# Patient Record
Sex: FEMALE | Race: WHITE | Employment: FULL TIME | URBAN - METROPOLITAN AREA
[De-identification: names, ages, dates, MRNs, and addresses within clinical notes are randomized per-mention and may not be internally consistent; named-entity substitution may affect disease eponyms.]

---

## 2017-04-04 ENCOUNTER — EMPLOYEE WELLNESS (OUTPATIENT)
Dept: OTHER | Age: 47
End: 2017-04-04

## 2017-04-04 LAB
CHOLESTEROL, TOTAL: 243 MG/DL (ref 0–199)
GLUCOSE BLD-MCNC: 75 MG/DL (ref 70–99)
HDLC SERPL-MCNC: 55 MG/DL (ref 40–60)
LDL CHOLESTEROL CALCULATED: 169 MG/DL
TRIGL SERPL-MCNC: 93 MG/DL (ref 0–150)

## 2017-04-10 ENCOUNTER — TELEPHONE (OUTPATIENT)
Dept: FAMILY MEDICINE CLINIC | Age: 47
End: 2017-04-10

## 2017-09-14 ENCOUNTER — OFFICE VISIT (OUTPATIENT)
Dept: DERMATOLOGY | Age: 47
End: 2017-09-14

## 2017-09-14 DIAGNOSIS — Z79.899 HIGH RISK MEDICATION USE: ICD-10-CM

## 2017-09-14 DIAGNOSIS — L40.9 PSORIASIS: ICD-10-CM

## 2017-09-14 DIAGNOSIS — L40.9 PSORIASIS: Primary | ICD-10-CM

## 2017-09-14 LAB
ALBUMIN SERPL-MCNC: 4.5 G/DL (ref 3.4–5)
ALP BLD-CCNC: 55 U/L (ref 40–129)
ALT SERPL-CCNC: 13 U/L (ref 10–40)
ANION GAP SERPL CALCULATED.3IONS-SCNC: 13 MMOL/L (ref 3–16)
AST SERPL-CCNC: 15 U/L (ref 15–37)
BASOPHILS ABSOLUTE: 0.1 K/UL (ref 0–0.2)
BASOPHILS RELATIVE PERCENT: 0.7 %
BILIRUB SERPL-MCNC: <0.2 MG/DL (ref 0–1)
BILIRUBIN DIRECT: <0.2 MG/DL (ref 0–0.3)
BILIRUBIN, INDIRECT: NORMAL MG/DL (ref 0–1)
BUN BLDV-MCNC: 11 MG/DL (ref 7–20)
CALCIUM SERPL-MCNC: 9.8 MG/DL (ref 8.3–10.6)
CHLORIDE BLD-SCNC: 102 MMOL/L (ref 99–110)
CO2: 25 MMOL/L (ref 21–32)
CREAT SERPL-MCNC: 0.7 MG/DL (ref 0.6–1.1)
EOSINOPHILS ABSOLUTE: 0.1 K/UL (ref 0–0.6)
EOSINOPHILS RELATIVE PERCENT: 1 %
GFR AFRICAN AMERICAN: >60
GFR NON-AFRICAN AMERICAN: >60
GLUCOSE BLD-MCNC: 85 MG/DL (ref 70–99)
HCT VFR BLD CALC: 44.1 % (ref 36–48)
HEMOGLOBIN: 14.4 G/DL (ref 12–16)
LYMPHOCYTES ABSOLUTE: 3 K/UL (ref 1–5.1)
LYMPHOCYTES RELATIVE PERCENT: 21.7 %
MCH RBC QN AUTO: 29.6 PG (ref 26–34)
MCHC RBC AUTO-ENTMCNC: 32.7 G/DL (ref 31–36)
MCV RBC AUTO: 90.3 FL (ref 80–100)
MONOCYTES ABSOLUTE: 0.7 K/UL (ref 0–1.3)
MONOCYTES RELATIVE PERCENT: 4.8 %
NEUTROPHILS ABSOLUTE: 10 K/UL (ref 1.7–7.7)
NEUTROPHILS RELATIVE PERCENT: 71.8 %
PDW BLD-RTO: 13.8 % (ref 12.4–15.4)
PLATELET # BLD: 203 K/UL (ref 135–450)
PMV BLD AUTO: 10.9 FL (ref 5–10.5)
POTASSIUM SERPL-SCNC: 4.7 MMOL/L (ref 3.5–5.1)
RBC # BLD: 4.88 M/UL (ref 4–5.2)
SODIUM BLD-SCNC: 140 MMOL/L (ref 136–145)
TOTAL PROTEIN: 7.1 G/DL (ref 6.4–8.2)
WBC # BLD: 14 K/UL (ref 4–11)

## 2017-09-14 PROCEDURE — 99213 OFFICE O/P EST LOW 20 MIN: CPT | Performed by: DERMATOLOGY

## 2017-09-16 LAB
QUANTIFERON (R) TB GOLD (INCUBATED): NEGATIVE
QUANTIFERON MITOGEN: >10 IU/ML
QUANTIFERON NIL: 0.02 IU/ML
QUANTIFERON TB AG MINUS NIL: 0.01 IU/ML (ref 0–0.34)

## 2017-12-05 ENCOUNTER — OFFICE VISIT (OUTPATIENT)
Dept: FAMILY MEDICINE CLINIC | Age: 47
End: 2017-12-05

## 2017-12-05 VITALS
OXYGEN SATURATION: 98 % | SYSTOLIC BLOOD PRESSURE: 120 MMHG | HEART RATE: 74 BPM | WEIGHT: 144 LBS | HEIGHT: 63 IN | DIASTOLIC BLOOD PRESSURE: 70 MMHG | TEMPERATURE: 98.4 F | BODY MASS INDEX: 25.52 KG/M2

## 2017-12-05 DIAGNOSIS — Z00.00 HEALTHCARE MAINTENANCE: Primary | ICD-10-CM

## 2017-12-05 DIAGNOSIS — J40 BRONCHITIS: ICD-10-CM

## 2017-12-05 DIAGNOSIS — E78.5 HYPERLIPIDEMIA, UNSPECIFIED HYPERLIPIDEMIA TYPE: ICD-10-CM

## 2017-12-05 DIAGNOSIS — L40.9 PSORIASIS: ICD-10-CM

## 2017-12-05 PROCEDURE — 99396 PREV VISIT EST AGE 40-64: CPT | Performed by: FAMILY MEDICINE

## 2017-12-05 RX ORDER — ALBUTEROL SULFATE 90 UG/1
2 AEROSOL, METERED RESPIRATORY (INHALATION) EVERY 4 HOURS PRN
Qty: 1 INHALER | Refills: 0 | Status: SHIPPED | OUTPATIENT
Start: 2017-12-05 | End: 2019-02-20 | Stop reason: ALTCHOICE

## 2017-12-05 RX ORDER — AZITHROMYCIN 250 MG/1
TABLET, FILM COATED ORAL
Qty: 1 PACKET | Refills: 0 | Status: SHIPPED | OUTPATIENT
Start: 2017-12-05 | End: 2017-12-15

## 2017-12-05 ASSESSMENT — PATIENT HEALTH QUESTIONNAIRE - PHQ9
SUM OF ALL RESPONSES TO PHQ9 QUESTIONS 1 & 2: 0
SUM OF ALL RESPONSES TO PHQ QUESTIONS 1-9: 0
1. LITTLE INTEREST OR PLEASURE IN DOING THINGS: 0
2. FEELING DOWN, DEPRESSED OR HOPELESS: 0

## 2017-12-05 NOTE — PROGRESS NOTES
History and Physical      Chief Coplaint:   Onesimo Abreu is a 49 y.o. female who presents for complete physical examination. HPI: URI: Symptoms started 2 weeks. Having coughing and more winded. Having congestion. No earache. No fever. No ST. Takes  Tylenol prn which helps with the congestion. Psoriasis: doing great on humira. HLD:  Works on diet sometimes. The 10-year ASCVD risk score (Bib Bustos, et al., 2013) is: 1.2%    Values used to calculate the score:      Age: 49 years      Sex: Female      Is Non- : No      Diabetic: No      Tobacco smoker: No      Systolic Blood Pressure: 778 mmHg      Is BP treated: No      HDL Cholesterol: 55 mg/dL      Total Cholesterol: 243 mg/dL      Wt Readings from Last 3 Encounters:   12/05/17 144 lb (65.3 kg)   07/12/16 143 lb 3.2 oz (65 kg)   06/06/16 141 lb (64 kg)     BP Readings from Last 3 Encounters:   12/05/17 120/70   07/12/16 112/68   06/06/16 104/76       Patient Active Problem List   Diagnosis    Psoriasis    Hyperlipidemia       Preventive Care:  Health Maintenance   Topic Date Due    Flu vaccine (1) 09/01/2017    Cervical cancer screen  06/06/2019    Lipid screen  05/02/2021    DTaP/Tdap/Td vaccine (2 - Td) 06/06/2026    HIV screen  Completed        Immunization History   Administered Date(s) Administered    Tdap (Boostrix, Adacel) 06/06/2016       Allergies   Allergen Reactions    Penicillins Anaphylaxis     Outpatient Prescriptions Marked as Taking for the 12/5/17 encounter (Office Visit) with Rickey Diamond MD   Medication Sig Dispense Refill    azithromycin (ZITHROMAX Z-TJ) 250 MG tablet Take 2 tablets on day 1, then 1 tablet daily for the next 4 days.  1 packet 0    albuterol sulfate HFA (PROAIR HFA) 108 (90 Base) MCG/ACT inhaler Inhale 2 puffs into the lungs every 4 hours as needed for Wheezing 1 Inhaler 0    adalimumab (HUMIRA) 40 MG/0.8ML injection 40 mg SC q2 weeks 2 each 6       Past Medical friction rub. No murmur heard. Pulmonary/Chest: Effort normal and breath sounds normal. No respiratory distress. She has no wheezes, rhonchi or rales. Abdominal: Soft, non-tender. Bowel sounds are normal. She exhibits no palpable organomegaly or mass. Musculoskeletal: Normal range of motion. She exhibits no edema. Neurological: She is alert and oriented. No cranial nerve deficit. Coordination normal.   Skin: Skin is warm and dry. There is no rash or erythema. Psychiatric: She has a normal mood and affect. Her speech is normal and behavior is normal. Judgment, cognition and memory are normal.       Assessment/Plan     1. Healthcare maintenance  Annual physical exam done today. Counseled on preventative care and a healthy lifestlye. Discussed optimal calcium intake emphasizing the importance of getting as much of the daily calcium requirement as possible from dietary sources. Immunization history reviewed. 2. Bronchitis  Discussed supportive care. Medications as listed below. Follow-up if not improving or worsening.  - azithromycin (ZITHROMAX Z-TJ) 250 MG tablet; Take 2 tablets on day 1, then 1 tablet daily for the next 4 days. Dispense: 1 packet; Refill: 0  - albuterol sulfate HFA (PROAIR HFA) 108 (90 Base) MCG/ACT inhaler; Inhale 2 puffs into the lungs every 4 hours as needed for Wheezing  Dispense: 1 Inhaler; Refill: 0    3. Psoriasis  Stable. Continue current regimen. Continue to follow up with dermatology. Discussed reducing risk of infections given that she is on Humira. 4. Hyperlipidemia, unspecified hyperlipidemia type  Discussed medication versus diet and exercise. Patient does not want to do medication at this time. She'll continue to work on diet and exercise more diligently. Discussed medications with patient, who voiced understanding of their use and indications. All questions answered.     Return in about 1 year (around 12/5/2018) for Physical.

## 2018-02-14 ENCOUNTER — OFFICE VISIT (OUTPATIENT)
Dept: DERMATOLOGY | Age: 48
End: 2018-02-14

## 2018-02-14 DIAGNOSIS — L40.9 PSORIASIS: Primary | ICD-10-CM

## 2018-02-14 DIAGNOSIS — D22.9 BENIGN NEVUS: ICD-10-CM

## 2018-02-14 DIAGNOSIS — Z79.899 HIGH RISK MEDICATION USE: ICD-10-CM

## 2018-02-14 DIAGNOSIS — L57.8 DIFFUSE PHOTODAMAGE OF SKIN: ICD-10-CM

## 2018-02-14 PROCEDURE — 99214 OFFICE O/P EST MOD 30 MIN: CPT | Performed by: DERMATOLOGY

## 2018-02-27 ENCOUNTER — TELEPHONE (OUTPATIENT)
Dept: FAMILY MEDICINE CLINIC | Age: 48
End: 2018-02-27

## 2018-02-27 NOTE — TELEPHONE ENCOUNTER
Pt called,  had suggested cholesterol medication based on be well with in lab results and patient declined,but has reconsidered and would like mediation sent to [facility] pharmacy as a 90 day supply

## 2018-03-20 VITALS — BODY MASS INDEX: 25.06 KG/M2 | WEIGHT: 137 LBS

## 2018-03-26 ENCOUNTER — TELEPHONE (OUTPATIENT)
Dept: FAMILY MEDICINE CLINIC | Age: 48
End: 2018-03-26

## 2018-03-26 NOTE — TELEPHONE ENCOUNTER
Given that it's been almost a year since she last had her labs checked out recommend that we recheck her cholesterol prior to starting medication.

## 2018-03-26 NOTE — TELEPHONE ENCOUNTER
Pt called to say she changed her mind on taking the cholesterol medication. Pt would like to have a new Rx called in to [facility] order.

## 2018-05-14 ENCOUNTER — EMPLOYEE WELLNESS (OUTPATIENT)
Dept: OTHER | Age: 48
End: 2018-05-14

## 2018-05-21 VITALS — BODY MASS INDEX: 25.86 KG/M2 | WEIGHT: 146 LBS

## 2018-06-07 DIAGNOSIS — E78.5 HYPERLIPIDEMIA, UNSPECIFIED HYPERLIPIDEMIA TYPE: Primary | ICD-10-CM

## 2018-06-07 RX ORDER — ATORVASTATIN CALCIUM 10 MG/1
10 TABLET, FILM COATED ORAL DAILY
Qty: 90 TABLET | Refills: 0 | Status: SHIPPED | OUTPATIENT
Start: 2018-06-07 | End: 2018-06-07 | Stop reason: SDUPTHER

## 2018-06-07 RX ORDER — ATORVASTATIN CALCIUM 10 MG/1
10 TABLET, FILM COATED ORAL DAILY
Qty: 90 TABLET | Refills: 0 | Status: SHIPPED
Start: 2018-06-07 | End: 2020-10-16

## 2018-06-18 RX ORDER — PRAVASTATIN SODIUM 10 MG
10 TABLET ORAL DAILY
Qty: 90 TABLET | Refills: 0 | Status: SHIPPED | OUTPATIENT
Start: 2018-06-18 | End: 2019-02-20

## 2018-08-22 ENCOUNTER — OFFICE VISIT (OUTPATIENT)
Dept: DERMATOLOGY | Age: 48
End: 2018-08-22

## 2018-08-22 DIAGNOSIS — Z79.899 HIGH RISK MEDICATION USE: ICD-10-CM

## 2018-08-22 DIAGNOSIS — L40.9 PSORIASIS: ICD-10-CM

## 2018-08-22 DIAGNOSIS — L40.9 PSORIASIS: Primary | ICD-10-CM

## 2018-08-22 LAB
ALBUMIN SERPL-MCNC: 4.4 G/DL (ref 3.4–5)
ALP BLD-CCNC: 45 U/L (ref 40–129)
ALT SERPL-CCNC: 12 U/L (ref 10–40)
AST SERPL-CCNC: 15 U/L (ref 15–37)
BILIRUB SERPL-MCNC: <0.2 MG/DL (ref 0–1)
BILIRUBIN DIRECT: <0.2 MG/DL (ref 0–0.3)
BILIRUBIN, INDIRECT: NORMAL MG/DL (ref 0–1)
HCT VFR BLD CALC: 37 % (ref 36–48)
HEMOGLOBIN: 12.2 G/DL (ref 12–16)
MCH RBC QN AUTO: 26.5 PG (ref 26–34)
MCHC RBC AUTO-ENTMCNC: 33 G/DL (ref 31–36)
MCV RBC AUTO: 80.4 FL (ref 80–100)
PDW BLD-RTO: 16.9 % (ref 12.4–15.4)
PLATELET # BLD: 243 K/UL (ref 135–450)
PMV BLD AUTO: 10.1 FL (ref 5–10.5)
RBC # BLD: 4.6 M/UL (ref 4–5.2)
TOTAL PROTEIN: 7.3 G/DL (ref 6.4–8.2)
WBC # BLD: 7.7 K/UL (ref 4–11)

## 2018-08-22 PROCEDURE — 99213 OFFICE O/P EST LOW 20 MIN: CPT | Performed by: DERMATOLOGY

## 2018-08-22 NOTE — PROGRESS NOTES
[facility]) Dermatology  Cyndie Hernandez M.D.  [address] [place] [address], [place]  1970    49 y.o. female     Date of Visit: 8/22/2018    Chief Complaint:   Chief Complaint   Patient presents with    Follow-up     declines TBSE, Psoriasis is gone on Humira treatment         I was asked to see this patient by Dr. Esparza ref. provider found. History of Present Illness:  1. Patient presents for follow-up of psoriasis. Has been on Humira since December 2016. Has developed some mild erythema and pruritus lasts for about a day after her injections-resolves without intervention. Very happy with her improvement on Humira-rarely has active psoriasis. Occasional small papule on her elbows. Otherwise clear. Total body skin exam done at her last visit. Review of Systems:  Constitutional: Reports general sense of well-being       Past Medical History, Surgical History, Family History, Medications and Allergies reviewed. Social History:   Social History     Social History    Marital status:      Spouse name: N/A    Number of children: N/A    Years of education: N/A     Occupational History    Not on file. Social History Main Topics    Smoking status: Former Smoker     Packs/day: 1.00     Years: 10.00     Types: Cigarettes     Start date: 2/1/1992     Quit date: 2/1/2002    Smokeless tobacco: Never Used    Alcohol use 0.0 oz/week      Comment: socially    Drug use: No    Sexual activity: Yes     Partners: Male     Other Topics Concern    Not on file     Social History Narrative    06/06/2016    Lives with spouse and 4 kids. Has 12yo, 12yo. Has 2 grown children. Physical Examination       -General: Well-appearing, NAD  1. Well-developed well-nourished. Few small erythematous papules on her elbows. Otherwise scalp, arms, legs clear. Mild background photodamage with freckling and lentigines. Moderate tan. Assessment and Plan     1.  Psoriasis -Minimal active psoriasis, well

## 2018-08-26 LAB
QUANTI TB GOLD PLUS: NEGATIVE
QUANTI TB1 MINUS NIL: 0 IU/ML (ref 0–0.34)
QUANTI TB2 MINUS NIL: 0 IU/ML (ref 0–0.34)
QUANTIFERON MITOGEN: >10 IU/ML
QUANTIFERON NIL: 0.03 IU/ML

## 2018-08-28 ENCOUNTER — PATIENT MESSAGE (OUTPATIENT)
Dept: FAMILY MEDICINE CLINIC | Age: 48
End: 2018-08-28

## 2019-02-20 ENCOUNTER — OFFICE VISIT (OUTPATIENT)
Dept: DERMATOLOGY | Age: 49
End: 2019-02-20
Payer: COMMERCIAL

## 2019-02-20 DIAGNOSIS — L72.0 MILIUM: ICD-10-CM

## 2019-02-20 DIAGNOSIS — L40.9 PSORIASIS: Primary | ICD-10-CM

## 2019-02-20 PROCEDURE — 99213 OFFICE O/P EST LOW 20 MIN: CPT | Performed by: DERMATOLOGY

## 2019-02-20 RX ORDER — CLOBETASOL PROPIONATE 0.5 MG/G
CREAM TOPICAL
Qty: 60 G | Refills: 2 | Status: SHIPPED | OUTPATIENT
Start: 2019-02-20

## 2019-02-21 ENCOUNTER — OFFICE VISIT (OUTPATIENT)
Dept: ORTHOPEDIC SURGERY | Age: 49
End: 2019-02-21
Payer: COMMERCIAL

## 2019-02-21 VITALS
BODY MASS INDEX: 25.34 KG/M2 | WEIGHT: 143 LBS | DIASTOLIC BLOOD PRESSURE: 58 MMHG | SYSTOLIC BLOOD PRESSURE: 115 MMHG | HEIGHT: 63 IN

## 2019-02-21 DIAGNOSIS — M18.12 ARTHRITIS OF CARPOMETACARPAL (CMC) JOINT OF LEFT THUMB: ICD-10-CM

## 2019-02-21 DIAGNOSIS — M79.644 FINGER PAIN, RIGHT: Primary | ICD-10-CM

## 2019-02-21 DIAGNOSIS — M18.11 ARTHRITIS OF CARPOMETACARPAL (CMC) JOINT OF RIGHT THUMB: ICD-10-CM

## 2019-02-21 DIAGNOSIS — M79.645 FINGER PAIN, LEFT: ICD-10-CM

## 2019-02-21 PROCEDURE — 99203 OFFICE O/P NEW LOW 30 MIN: CPT | Performed by: ORTHOPAEDIC SURGERY

## 2019-02-21 PROCEDURE — L3924 HFO WITHOUT JOINTS PRE OTS: HCPCS | Performed by: ORTHOPAEDIC SURGERY

## 2019-02-21 PROCEDURE — 20600 DRAIN/INJ JOINT/BURSA W/O US: CPT | Performed by: ORTHOPAEDIC SURGERY

## 2019-05-22 ENCOUNTER — TELEPHONE (OUTPATIENT)
Dept: DERMATOLOGY | Age: 49
End: 2019-05-22

## 2019-10-02 ENCOUNTER — TELEPHONE (OUTPATIENT)
Dept: DERMATOLOGY | Age: 49
End: 2019-10-02

## 2019-10-04 DIAGNOSIS — L40.9 PSORIASIS: ICD-10-CM

## 2019-10-04 LAB
A/G RATIO: 1.9 (ref 1.1–2.2)
ALBUMIN SERPL-MCNC: 4.7 G/DL (ref 3.4–5)
ALP BLD-CCNC: 55 U/L (ref 40–129)
ALT SERPL-CCNC: 14 U/L (ref 10–40)
ANION GAP SERPL CALCULATED.3IONS-SCNC: 16 MMOL/L (ref 3–16)
AST SERPL-CCNC: 18 U/L (ref 15–37)
BILIRUB SERPL-MCNC: 0.3 MG/DL (ref 0–1)
BUN BLDV-MCNC: 8 MG/DL (ref 7–20)
CALCIUM SERPL-MCNC: 10 MG/DL (ref 8.3–10.6)
CHLORIDE BLD-SCNC: 105 MMOL/L (ref 99–110)
CO2: 23 MMOL/L (ref 21–32)
CREAT SERPL-MCNC: 0.8 MG/DL (ref 0.6–1.1)
GFR AFRICAN AMERICAN: >60
GFR NON-AFRICAN AMERICAN: >60
GLOBULIN: 2.5 G/DL
GLUCOSE BLD-MCNC: 90 MG/DL (ref 70–99)
HCT VFR BLD CALC: 42.4 % (ref 36–48)
HEMOGLOBIN: 13.8 G/DL (ref 12–16)
MCH RBC QN AUTO: 27.4 PG (ref 26–34)
MCHC RBC AUTO-ENTMCNC: 32.5 G/DL (ref 31–36)
MCV RBC AUTO: 84.1 FL (ref 80–100)
PDW BLD-RTO: 17.2 % (ref 12.4–15.4)
PLATELET # BLD: 239 K/UL (ref 135–450)
PMV BLD AUTO: 10.5 FL (ref 5–10.5)
POTASSIUM SERPL-SCNC: 4.5 MMOL/L (ref 3.5–5.1)
RBC # BLD: 5.05 M/UL (ref 4–5.2)
SODIUM BLD-SCNC: 144 MMOL/L (ref 136–145)
TOTAL PROTEIN: 7.2 G/DL (ref 6.4–8.2)
WBC # BLD: 7.1 K/UL (ref 4–11)

## 2019-10-07 LAB
QUANTI TB GOLD PLUS: NEGATIVE
QUANTI TB1 MINUS NIL: 0 IU/ML (ref 0–0.34)
QUANTI TB2 MINUS NIL: 0 IU/ML (ref 0–0.34)
QUANTIFERON MITOGEN: >10 IU/ML
QUANTIFERON NIL: 0.02 IU/ML

## 2019-10-31 ENCOUNTER — OFFICE VISIT (OUTPATIENT)
Dept: DERMATOLOGY | Age: 49
End: 2019-10-31
Payer: COMMERCIAL

## 2019-10-31 DIAGNOSIS — D48.5 NEOPLASM OF UNCERTAIN BEHAVIOR OF SKIN: ICD-10-CM

## 2019-10-31 DIAGNOSIS — D22.9 BENIGN NEVUS: ICD-10-CM

## 2019-10-31 DIAGNOSIS — L40.9 PSORIASIS: Primary | ICD-10-CM

## 2019-10-31 DIAGNOSIS — L57.0 KERATOSIS, ACTINIC: ICD-10-CM

## 2019-10-31 PROCEDURE — 99214 OFFICE O/P EST MOD 30 MIN: CPT | Performed by: DERMATOLOGY

## 2019-10-31 PROCEDURE — 11102 TANGNTL BX SKIN SINGLE LES: CPT | Performed by: DERMATOLOGY

## 2019-10-31 PROCEDURE — 17000 DESTRUCT PREMALG LESION: CPT | Performed by: DERMATOLOGY

## 2019-10-31 PROCEDURE — 17003 DESTRUCT PREMALG LES 2-14: CPT | Performed by: DERMATOLOGY

## 2019-11-04 LAB — DERMATOLOGY PATHOLOGY REPORT: NORMAL

## 2020-06-25 RX ORDER — ADALIMUMAB 40MG/0.4ML
KIT SUBCUTANEOUS
Qty: 2 EACH | Refills: 6 | Status: SHIPPED | OUTPATIENT
Start: 2020-06-25 | Stop reason: SDUPTHER

## 2020-07-22 ENCOUNTER — OFFICE VISIT (OUTPATIENT)
Dept: DERMATOLOGY | Age: 50
End: 2020-07-22
Payer: COMMERCIAL

## 2020-07-22 VITALS — TEMPERATURE: 97.9 F

## 2020-07-22 PROCEDURE — 99213 OFFICE O/P EST LOW 20 MIN: CPT | Performed by: DERMATOLOGY

## 2020-07-22 NOTE — PROGRESS NOTES
[facility]) Dermatology  Hemal Reddy M.D.  [address] [place] [address]  1970    47 y.o. female     Date of Visit: 2020    Chief Complaint:   Chief Complaint   Patient presents with    Skin Exam        I was asked to see this patient by Dr. Esparza ref. provider found. History of Present Illness:  1. Total-body skin exam      Multiple nevi-stable in size, shape, color. Has not noticed any new or changing pigmented lesions. Psoriasis-doing well on Humira. Has residual psoriasis both elbows that is not bothersome. Very happy with clearance of psoriasis on Humira. She is using citrate free formula which is less painful. No joint pain. No difficulty with infections. Due for lab draw-was delayed due to [place] outbreak. Skin history:    Psoriasis-Humira since 2016. Residual psoriasis elbows    No personal history of skin cancer. + History of actinic keratoses-liquid nitrogen    Mother with a history of melanoma    Former tanning bed user. Now does practice sun avoidance and wear sunscreen    Review of Systems:  Constitutional: Reports general sense of well-being       Past Medical History, Surgical History, Family History, Medications and Allergies reviewed.     Social History:   Social History     Socioeconomic History    Marital status:      Spouse name: Not on file    Number of children: Not on file    Years of education: Not on file    Highest education level: Not on file   Occupational History    Not on file   Social Needs    Financial resource strain: Not on file    Food insecurity     Worry: Not on file     Inability: Not on file    Transportation needs     Medical: Not on file     Non-medical: Not on file   Tobacco Use    Smoking status: Former Smoker     Packs/day: 1.00     Years: 10.00     Pack years: 10.00     Types: Cigarettes     Start date: 1992     Last attempt to quit: 2002     Years since quittin.4    Smokeless tobacco: Never Used Substance and Sexual Activity    Alcohol use: Yes     Alcohol/week: 0.0 standard drinks     Comment: socially    Drug use: No    Sexual activity: Yes     Partners: Male   Lifestyle    Physical activity     Days per week: Not on file     Minutes per session: Not on file    Stress: Not on file   Relationships    Social connections     Talks on phone: Not on file     Gets together: Not on file     Attends Jainism service: Not on file     Active member of club or organization: Not on file     Attends meetings of clubs or organizations: Not on file     Relationship status: Not on file    Intimate partner violence     Fear of current or ex partner: Not on file     Emotionally abused: Not on file     Physically abused: Not on file     Forced sexual activity: Not on file   Other Topics Concern    Not on file   Social History Narrative    06/06/2016    Lives with spouse and 4 kids. Has 12yo, 10yo. Has 2 grown children. Physical Examination       -General: Well-appearing, NAD  Normal affect. Total body skin exam including scalp, face, neck, chest, abdomen, back, bilateral upper extremities, bilateral lower extremities, ocular conjunctiva, external lips, and nails was performed. Examination normal unless stated below. Underwear area not examined. Scattered on the trunk and extremities are multiple well-defined round and oval symmetric smoothly-bordered uniformly brown macules and papules. Well-demarcated erythematous scaly plaques both elbows      Assessment and Plan     1. Psoriasis-continue Humira citrate free formula.   Due for labs-we will have her draw labs including QuantiFERON gold, which is due in October.    -Reviewed risks of injection-site reaction, flu-like symptoms, immunosuppression (serious infection, reactivation of latent TB), leukopenia, increased risk of MACE events, lymphoma/malignancy, parasthesia/MS changes, autoimmune disease.  -Baseline labs: CBC/diff, renal, LFTs  -Maintenance labs: LFTs q6mo. CBC/diff, renal annually.  -QuantiFERON gold 10/19     2. Benign nevus - Benign acquired melanocytic nevi  -Recommend monthly self skin exams   -Educated regarding the ABCDEs of melanoma detection   -Call for any new/changing moles or concerning lesions  -Reviewed sun protective behavior -- sun avoidance during the peak hours of the day, sun-protective clothing (including hat and sunglasses), sunscreen use (water resistant, broad spectrum, SPF at least 30, need for reapplication every 2 to 3 hours), avoidance of tanning beds        Addendum: Discussed tretinoin . 025% cream QHS for photodamage- reviewed risks, side effects- avoid waxes, peels. May worsen mild background erythema (vasc rosacea). + sun protection.

## 2020-07-24 ENCOUNTER — PATIENT MESSAGE (OUTPATIENT)
Dept: DERMATOLOGY | Age: 50
End: 2020-07-24

## 2020-07-24 NOTE — TELEPHONE ENCOUNTER
From: Harshal Chapin  To: Mateusz Rodriguez MD  Sent: 7/24/2020 8:59 AM EDT  Subject: Prescription Question    Hello  During my visit I asked for a prescription of Retin-A cream but I do not see where it has been sent to [facility]. Can you please review and send to [facility].      Thanks  [employer]

## 2020-08-03 ENCOUNTER — EMPLOYEE WELLNESS (OUTPATIENT)
Dept: OTHER | Age: 50
End: 2020-08-03

## 2020-08-03 LAB
CHOLESTEROL, TOTAL: 294 MG/DL
CHOLESTEROL/HDL RATIO: ABNORMAL
GLUCOSE BLD-MCNC: 100 MG/DL
HDLC SERPL-MCNC: 61 MG/DL (ref 35–70)
LDL CHOLESTEROL CALCULATED: 195 MG/DL (ref 0–160)
NONHDLC SERPL-MCNC: ABNORMAL MG/DL
TRIGL SERPL-MCNC: 188 MG/DL
VLDLC SERPL CALC-MCNC: ABNORMAL MG/DL

## 2020-08-17 ENCOUNTER — TELEPHONE (OUTPATIENT)
Dept: FAMILY MEDICINE CLINIC | Age: 50
End: 2020-08-17

## 2020-08-17 ENCOUNTER — PATIENT MESSAGE (OUTPATIENT)
Dept: FAMILY MEDICINE CLINIC | Age: 50
End: 2020-08-17

## 2020-08-17 NOTE — TELEPHONE ENCOUNTER
The patient's cholesterol was very high on recent wellness screen for work. She has not been seen since 2017. Please call her and ask her to schedule an appointment if she is still using me as her PCP. Thank you.

## 2020-09-23 DIAGNOSIS — L40.9 PSORIASIS: ICD-10-CM

## 2020-09-23 LAB
HCT VFR BLD CALC: 45.5 % (ref 36–48)
HEMOGLOBIN: 15 G/DL (ref 12–16)
MCH RBC QN AUTO: 30.8 PG (ref 26–34)
MCHC RBC AUTO-ENTMCNC: 33 G/DL (ref 31–36)
MCV RBC AUTO: 93.5 FL (ref 80–100)
PDW BLD-RTO: 13.9 % (ref 12.4–15.4)
PLATELET # BLD: 209 K/UL (ref 135–450)
PMV BLD AUTO: 10.5 FL (ref 5–10.5)
RBC # BLD: 4.87 M/UL (ref 4–5.2)
WBC # BLD: 8.5 K/UL (ref 4–11)

## 2020-09-24 LAB
ALBUMIN SERPL-MCNC: 4.6 G/DL (ref 3.4–5)
ALP BLD-CCNC: 66 U/L (ref 40–129)
ALT SERPL-CCNC: 15 U/L (ref 10–40)
ANION GAP SERPL CALCULATED.3IONS-SCNC: 10 MMOL/L (ref 3–16)
AST SERPL-CCNC: 17 U/L (ref 15–37)
BILIRUB SERPL-MCNC: 0.3 MG/DL (ref 0–1)
BILIRUBIN DIRECT: <0.2 MG/DL (ref 0–0.3)
BILIRUBIN, INDIRECT: NORMAL MG/DL (ref 0–1)
BUN BLDV-MCNC: 10 MG/DL (ref 7–20)
CALCIUM SERPL-MCNC: 9.7 MG/DL (ref 8.3–10.6)
CHLORIDE BLD-SCNC: 103 MMOL/L (ref 99–110)
CO2: 26 MMOL/L (ref 21–32)
CREAT SERPL-MCNC: 0.8 MG/DL (ref 0.6–1.1)
GFR AFRICAN AMERICAN: >60
GFR NON-AFRICAN AMERICAN: >60
GLUCOSE BLD-MCNC: 85 MG/DL (ref 70–99)
POTASSIUM SERPL-SCNC: 4.6 MMOL/L (ref 3.5–5.1)
SODIUM BLD-SCNC: 139 MMOL/L (ref 136–145)
TOTAL PROTEIN: 7 G/DL (ref 6.4–8.2)

## 2020-09-24 NOTE — TELEPHONE ENCOUNTER
From: Reynaldo FERRARI  To: Sarah North  Sent: 8/17/2020 4:51 PM EDT  Subject: appointment needed    [place] -     Dr. Shikha Rubio received your biometric screening results that were done on 8/3/2020. She would like for you to schedule an appointment for your physical and to discuss your results. Your last appointment for a physical was with Dr. Shikha Rubio 12/5/2017 so you are overdue. Please let me know some days / time of day that are best and we can get you scheduled.        AYESHA Daly, AMT  Registered Medical Assistant for:     [address] [facility], [address]  [facility], [address]    P: [phone]  F: [phone]

## 2020-10-16 ENCOUNTER — OFFICE VISIT (OUTPATIENT)
Dept: FAMILY MEDICINE CLINIC | Age: 50
End: 2020-10-16
Payer: COMMERCIAL

## 2020-10-16 VITALS
OXYGEN SATURATION: 98 % | BODY MASS INDEX: 25.69 KG/M2 | HEART RATE: 65 BPM | TEMPERATURE: 96.9 F | DIASTOLIC BLOOD PRESSURE: 80 MMHG | SYSTOLIC BLOOD PRESSURE: 130 MMHG | WEIGHT: 145 LBS | HEIGHT: 63 IN

## 2020-10-16 PROCEDURE — 90686 IIV4 VACC NO PRSV 0.5 ML IM: CPT | Performed by: FAMILY MEDICINE

## 2020-10-16 PROCEDURE — 90750 HZV VACC RECOMBINANT IM: CPT | Performed by: FAMILY MEDICINE

## 2020-10-16 PROCEDURE — 90472 IMMUNIZATION ADMIN EACH ADD: CPT | Performed by: FAMILY MEDICINE

## 2020-10-16 PROCEDURE — 99213 OFFICE O/P EST LOW 20 MIN: CPT | Performed by: FAMILY MEDICINE

## 2020-10-16 PROCEDURE — 99396 PREV VISIT EST AGE 40-64: CPT | Performed by: FAMILY MEDICINE

## 2020-10-16 PROCEDURE — 90471 IMMUNIZATION ADMIN: CPT | Performed by: FAMILY MEDICINE

## 2020-10-16 RX ORDER — ROSUVASTATIN CALCIUM 5 MG/1
5 TABLET, COATED ORAL DAILY
Qty: 90 TABLET | Refills: 3 | Status: SHIPPED | OUTPATIENT
Start: 2020-10-16 | End: 2022-03-14 | Stop reason: SDUPTHER

## 2020-10-16 ASSESSMENT — PATIENT HEALTH QUESTIONNAIRE - PHQ9
2. FEELING DOWN, DEPRESSED OR HOPELESS: 0
SUM OF ALL RESPONSES TO PHQ QUESTIONS 1-9: 0
SUM OF ALL RESPONSES TO PHQ QUESTIONS 1-9: 0
1. LITTLE INTEREST OR PLEASURE IN DOING THINGS: 0
SUM OF ALL RESPONSES TO PHQ QUESTIONS 1-9: 0
SUM OF ALL RESPONSES TO PHQ9 QUESTIONS 1 & 2: 0

## 2020-10-16 NOTE — PROGRESS NOTES
History and Physical      Chief Complaint:   Santos Abdalla is a 45 y.o. female who presents for complete physical examination. Chief Complaint   Patient presents with    Annual Exam     not fasting        HPI: Psoriasis: doing well on humira. Seeing dermatology.      HLD:  Patient reports she had the be well labs this year. Stopped the lipitor as it made her nauseated. LMP:  9 mo ago but has spotting here or there still. No other symptoms.      SH: 10/2020: . Lives with son Madi Zamudio (patient here) and daughter Gisele Aguilera. In a relationship with someone else and sexually active. Moving soon. 06/06/2016  Lives with spouse and 4 kids. Has 12yo, 10yo. Has 2 grown children.      Vitals:    10/16/20 0825   BP: 130/80   Site: Left Upper Arm   Position: Sitting   Cuff Size: Medium Adult   Pulse: 65   Temp: 96.9 °F (36.1 °C)   TempSrc: Temporal   SpO2: 98%   Weight: 145 lb (65.8 kg)   Height: 5' 3\" (1.6 m)       Wt Readings from Last 3 Encounters:   10/16/20 145 lb (65.8 kg)   08/03/20 144 lb (65.3 kg)   02/21/19 143 lb (64.9 kg)     BP Readings from Last 3 Encounters:   10/16/20 130/80   02/21/19 (!) 115/58   12/05/17 120/70       Patient Active Problem List   Diagnosis    Psoriasis    Hyperlipidemia       Preventive Care:  Health Maintenance   Topic Date Due    Cervical cancer screen  06/06/2019    Breast cancer screen  09/25/2019    Colon cancer screen colonoscopy  09/25/2019    Shingles Vaccine (2 of 2) 12/11/2020    Lipid screen  04/04/2022    DTaP/Tdap/Td vaccine (2 - Td) 06/06/2026    Flu vaccine  Completed    HIV screen  Completed    Hepatitis A vaccine  Aged Out    Hepatitis B vaccine  Aged Out    Hib vaccine  Aged Out    Meningococcal (ACWY) vaccine  Aged Out    Pneumococcal 0-64 years Vaccine  Aged [employer] History   Administered Date(s) Administered    Influenza Virus Vaccine 10/25/2018, 10/29/2019    Influenza, Quadv, IM, PF (6 mo and older Fluzone, Flulaval, Fluarix, and 3 yrs and older Afluria) 10/16/2020    Tdap (Boostrix, Adacel) 2016    Zoster Recombinant (Shingrix) 10/16/2020       Allergies   Allergen Reactions    Penicillins Anaphylaxis     Outpatient Medications Marked as Taking for the 10/16/20 encounter (Office Visit) with Sandra Siegel MD   Medication Sig Dispense Refill    rosuvastatin (CRESTOR) 5 MG tablet Take 1 tablet by mouth daily 90 tablet 3    tretinoin (RETIN-A) 0.025 % cream Apply a pea sized amount to the face QHS 20 g 4    Adalimumab (HUMIRA PEN) 40 MG/0.4ML PNKT INJECT 40 MG UNDER THE SKIN EVERY OTHER WEEK. 2 each 6    clobetasol (TEMOVATE) 0.05 % cream Apply to affected area BID PRN flares 60 g 2       Past Medical History:   Diagnosis Date    Psoriasis      Past Surgical History:   Procedure Laterality Date    HERNIA REPAIR  2005     Family History   Problem Relation Age of Onset    Cancer Mother         skin cancer     Social History     Socioeconomic History    Marital status:      Spouse name: Not on file    Number of children: Not on file    Years of education: Not on file    Highest education level: Not on file   Occupational History    Not on file   Social Needs    Financial resource strain: Not on file    Food insecurity     Worry: Not on file     Inability: Not on file    Transportation needs     Medical: Not on file     Non-medical: Not on file   Tobacco Use    Smoking status: Former Smoker     Packs/day: 1.00     Years: 10.00     Pack years: 10.00     Types: Cigarettes     Start date: 1992     Last attempt to quit: 2002     Years since quittin.7    Smokeless tobacco: Never Used   Substance and Sexual Activity    Alcohol use:  Yes     Alcohol/week: 0.0 standard drinks     Comment: socially    Drug use: No    Sexual activity: Yes     Partners: Male   Lifestyle    Physical activity     Days per week: Not on file     Minutes per session: Not on file    Stress: Not on file Relationships    Social connections     Talks on phone: Not on file     Gets together: Not on file     Attends [employer] service: Not on file     Active member of club or organization: Not on file     Attends meetings of clubs or organizations: Not on file     Relationship status: Not on file    Intimate partner violence     Fear of current or ex partner: Not on file     Emotionally abused: Not on file     Physically abused: Not on file     Forced sexual activity: Not on file   Other Topics Concern    Not on file   Social History Narrative    06/06/2016    Lives with spouse and 4 kids. Has 12yo, 10yo. Has 2 grown children. Review of Systems:  A comprehensive review of systems was negative except for what was noted in the HPI. Physical Exam:   Vitals:    10/16/20 0825   BP: 130/80   Site: Left Upper Arm   Position: Sitting   Cuff Size: Medium Adult   Pulse: 65   Temp: 96.9 °F (36.1 °C)   TempSrc: Temporal   SpO2: 98%   Weight: 145 lb (65.8 kg)   Height: 5' 3\" (1.6 m)     Body mass index is 25.69 kg/m². Constitutional: She is oriented to person, place, and time. She appears well-developed and well-nourished. No distress. HEENT:   Head: Normocephalic and atraumatic. Right Ear: Tympanic membrane, external ear and ear canal normal.   Left Ear: Tympanic membrane, external ear and ear canal normal.   Nose: Nose normal.   Mouth/Throat: Oropharynx is clear and moist, and mucous membranes are normal.  There is no cervical adenopathy. Eyes: Conjunctivae and extraocular motions are normal. Pupils are equal, round, and reactive to light. Neck: Neck supple. No mass and no thyromegaly present. Cardiovascular: Normal rate, regular rhythm, normal heart sounds. Exam reveals no gallop and no friction rub. No murmur heard. Pulmonary/Chest: Effort normal and breath sounds normal. No respiratory distress. She has no wheezes, rhonchi or rales. Abdominal: Soft, non-tender.  Bowel sounds are normal. She exhibits no palpable organomegaly or mass. Genitourinary: normal external genitalia, vulva, vagina, uterus and adnexa. Breast exam:  breasts appear normal, cystic feeling mass L lateral breast  -mobile, well circumscribed, about 4 cm,  no skin or nipple changes or axillary nodes. Musculoskeletal: Normal range of motion. She exhibits no edema. Neurological: She is alert and oriented. No cranial nerve deficit. Coordination normal.   Skin: Skin is warm and dry. There is no rash or erythema. Psychiatric: She has a normal mood and affect. Her speech is normal and behavior is normal. Judgment, cognition and memory are normal.       Assessment/Plan     1. Healthcare maintenance  Annual physical exam done today. Counseled on preventative care and a healthy lifestlye. - PAP SMEAR  - [facility] DIGITAL DIAGNOSTIC AUGMENTED BILATERAL; Future    2. Encounter for screening mammogram for breast cancer  - [facility] Digital Screen Bilateral [TAB8015]; Future  - [facility] DIGITAL DIAGNOSTIC AUGMENTED BILATERAL; Future    3. Colon cancer screening  - Boo Pinon MD (Colonoscopy), General Surgery, [facility]    4. Encounter for annual routine gynecological examination  Pap done today. 5. Need for vaccination  - INFLUENZA, QUADV, 3 YRS AND OLDER, IM PF, PREFILL SYR OR SDV, 0.5ML (AFLURIA QUADV, PF)  - Zoster Subunit (SHINGRIX)    6. Breast lump  Per patient stable for years. Will check diagnostic mammogram and L breast ultrasound. If not seen on imaging patient advised will have her see breast specialist.   - US BREAST COMPLETE LEFT; Future    7. Spotting  Stable. No other symptoms. Transvaginal US.   - US NON OB TRANSVAGINAL; Future    8. Hyperlipidemia, unspecified hyperlipidemia type  Increased. Counseled on lifestyle modifications including diet and exercise. Discussed options. Crestor.   - LIPID PANEL; Future      Discussed medications with patient, who voiced understanding of their use and indications.  All questions answered.     Return in about 1 year (around 10/16/2021) for Physical.

## 2020-10-16 NOTE — PROGRESS NOTES
Vaccine Information Sheet, \"Influenza - Inactivated\"  given to Zada Romberg, or parent/legal guardian of  Zada Romberg and verbalized understanding. Patient responses:    Have you ever had a reaction to a flu vaccine? No  Do you have any current illness? No  Have you ever had Guillian [place] Syndrome? No  Do you have a serious allergy to any of the follow: Neomycin, Polymyxin, Thimerosal, eggs or egg products? No    Flu vaccine given per order. Please see immunization tab. Risks and benefits explained. Current VIS given.       Immunizations Administered     Name Date Dose Route    Influenza, Quadv, IM, PF (6 mo and older Fluzone, Flulaval, Fluarix, and 3 yrs and older Afluria) 10/16/2020 0.5 mL Intramuscular    Site: Deltoid- Left    Lot: [id number]    NDC: 63933-813-10    Zoster Recombinant (Shingrix) 10/16/2020 0.5 mL Intramuscular    Site: Deltoid- Right    Lot: [id number]    NDC: 90353-795-58

## 2020-10-19 ENCOUNTER — HOSPITAL ENCOUNTER (OUTPATIENT)
Dept: ULTRASOUND IMAGING | Age: 50
Discharge: HOME | End: 2020-10-19
Payer: COMMERCIAL

## 2020-10-19 ENCOUNTER — HOSPITAL ENCOUNTER (OUTPATIENT)
Dept: MAMMOGRAPHY | Age: 50
Discharge: HOME | End: 2020-10-19
Payer: COMMERCIAL

## 2020-10-19 VITALS — WEIGHT: 144 LBS | BODY MASS INDEX: 25.51 KG/M2

## 2020-10-19 LAB
HPV COMMENT: NORMAL
HPV TYPE 16: NOT DETECTED
HPV TYPE 18: NOT DETECTED
HPVOH (OTHER TYPES): NOT DETECTED

## 2020-10-19 PROCEDURE — 77065 DX MAMMO INCL CAD UNI: CPT

## 2020-10-19 PROCEDURE — 2709999900 US BREAST BIOPSY W LOC DEVICE 1ST LESION LEFT

## 2020-10-19 PROCEDURE — 88305 TISSUE EXAM BY PATHOLOGIST: CPT

## 2020-10-19 PROCEDURE — G0279 TOMOSYNTHESIS, MAMMO: HCPCS

## 2020-10-19 PROCEDURE — 76642 ULTRASOUND BREAST LIMITED: CPT

## 2020-10-27 ENCOUNTER — TELEPHONE (OUTPATIENT)
Dept: SURGERY | Age: 50
End: 2020-10-27

## 2020-10-27 ENCOUNTER — INITIAL CONSULT (OUTPATIENT)
Dept: SURGERY | Age: 50
End: 2020-10-27
Payer: COMMERCIAL

## 2020-10-27 VITALS
WEIGHT: 145.8 LBS | HEART RATE: 75 BPM | DIASTOLIC BLOOD PRESSURE: 74 MMHG | TEMPERATURE: 97.5 F | HEIGHT: 62 IN | SYSTOLIC BLOOD PRESSURE: 115 MMHG | BODY MASS INDEX: 26.83 KG/M2

## 2020-10-27 PROBLEM — N63.20 LEFT BREAST MASS: Status: ACTIVE | Noted: 2020-10-27

## 2020-10-27 PROCEDURE — 99244 OFF/OP CNSLTJ NEW/EST MOD 40: CPT | Performed by: SURGERY

## 2020-10-27 NOTE — PROGRESS NOTES
Subjective:      Patient ID: Karyna Hickey is a 45 y.o. female. HPI   Chief Complaint   Patient presents with    Surgical Consult     Referred by Dr. Kiet Remy - Surgical Consult     Patient was found to have a left breast mass, present for a few years. She says it has been getting larger. She had a recent diagnostic mammogram and left breast u/s showing a 5.3 x 2.2 cm mass 3-4:00, 7 cmfn. Core biopsy showed benign mature adipose tissue c/w lipoma. However, because of the large size of the mass and the possibility of a sampling error, possible excision was recommended. No pain. Breast History:  History of Previous Breast Biopsy:no  Self Breast Exams Completed:yes  Family History of Breast Cancer:no    Family History of Other Cancers:no   Ashkenazi Rastafari Decent:no  Bra Size: 36B     Gyne History:  : 5   Para: 3  Age of Menarche: 14 years  Age of Menopause: 45 years   Age of first live Birth: 23 years  History of Hysterectomy / CRUZ-BSO: No  History of OCP's/HRT:No    Family History or personal history of Ovarian Cancer: no        Past Medical History:   Diagnosis Date    Psoriasis        Past Surgical History:   Procedure Laterality Date    HERNIA REPAIR  2005    US BREAST NEEDLE BIOPSY LEFT  10/19/2020     BREAST NEEDLE BIOPSY LEFT 10/19/2020 [facility] ULTRASOUND       Current Outpatient Medications   Medication Sig Dispense Refill    rosuvastatin (CRESTOR) 5 MG tablet Take 1 tablet by mouth daily 90 tablet 3    tretinoin (RETIN-A) 0.025 % cream Apply a pea sized amount to the face QHS 20 g 4    Adalimumab (HUMIRA PEN) 40 MG/0.4ML PNKT INJECT 40 MG UNDER THE SKIN EVERY OTHER WEEK. 2 each 6    clobetasol (TEMOVATE) 0.05 % cream Apply to affected area BID PRN flares 60 g 2     No current facility-administered medications for this visit.         Social History     Socioeconomic History    Marital status:      Spouse name: Not on file    Number of children: Not on file    Years of education: Not on file    Highest education level: Not on file   Occupational History    Not on file   Social Needs    Financial resource strain: Not on file    Food insecurity     Worry: Not on file     Inability: Not on file    Transportation needs     Medical: Not on file     Non-medical: Not on file   Tobacco Use    Smoking status: Former Smoker     Packs/day: 1.00     Years: 10.00     Pack years: 10.00     Types: Cigarettes     Start date: 1992     Last attempt to quit: 2002     Years since quittin.7    Smokeless tobacco: Never Used   Substance and Sexual Activity    Alcohol use: Yes     Alcohol/week: 0.0 standard drinks     Comment: socially    Drug use: No    Sexual activity: Yes     Partners: Male   Lifestyle    Physical activity     Days per week: Not on file     Minutes per session: Not on file    Stress: Not on file   Relationships    Social connections     Talks on phone: Not on file     Gets together: Not on file     Attends Mormonism service: Not on file     Active member of club or organization: Not on file     Attends meetings of clubs or organizations: Not on file     Relationship status: Not on file    Intimate partner violence     Fear of current or ex partner: Not on file     Emotionally abused: Not on file     Physically abused: Not on file     Forced sexual activity: Not on file   Other Topics Concern    Not on file   Social History Narrative    2016    Lives with spouse and 4 kids. Has 12yo, 12yo. Has 2 grown children. ROS  Constitutional: no weight loss, fever, night sweats   Skin: negative  Cardiovascular: no chest pain or palpitations   Pulmonary: No cough, sputum, or hemoptysis   GI:No abdominal pain  Breast: see above  All other systems were reviewed and are negative    Objective:   Physical Exam  Vitals signs reviewed. Exam conducted with a chaperone present. Constitutional:       General: She is not in acute distress.      Appearance: Normal and unappealing cosmetics were reviewed. All questions were answered and she agrees to proceed. The patient was counseled at length about the risks of tavon Covid-19 in the jimmie-operative and post-operative states including the recovery window of their procedure. The patient was made aware that tavon Covid-19 after a surgical procedure may worsen their prognosis for recovering from the virus and lend to a higher morbidity and or mortality risk. The patient was given the options of postponing their procedure. All of the risks, benefits, and alternatives were discussed. The patient does wish to proceed with the procedure.              Brigitte Canseco MD

## 2020-10-27 NOTE — LETTER
CONSENT TO OPERATION  AND/OR OTHER PROCEDURE(S)          PATIENT : Tiffanie Izaguirre   YOB: 1970      DATE : 10/27/20           I request and consent that Dr. Alicia Blackmon  and/or his associates or assistants perform an operation and/or procedures on the above patient at  [facility], to treat the condition(s) which appear indicated by the diagnostic studies already performed. I have been told that in general terms the nature, purpose and reasonable expectations of the operation and/or procedure(s) are:    Left Breast Incisional Biopsy        1. It has been explained to me by the informing physician that during the course of the operation and/or procedure(s) unforeseen conditions may be revealed that necessitate an extension of the original operation and/or procedure(s) or different operation and/or procedures than those set forth in Paragraph 1. I therefore authorize and request that my physician and/or his associates or assistants perform such operations and/or procedures as are necessary and desirable in the exercise of professional judgment. The authority granted under this Paragraph 2 shall extend to all conditions that require treatment and are known to my physician at the time the operation is commenced. 2. I have been made aware by the informing physician of certain risks and consequences that are associated with the operation and/or procedure(s) described in Paragraph 1, the reasonable alternative methods or treatment, the possible consequences, the possibility that the operation and/or procedure(s) may be unsuccessful and the possibility of complications. I understand the reasonably known risks to be:      ? Bleeding  ? Infection/COVID 19  ? Poor Healing  ? Possible Damage to Nerve, Vessel, Tendon/Muscle or Bone  ? Possible need for additional Treatment/Surgery/Re Excision  ? Stiffness  ? Persistent Pain   ?  Residual or Recurrent Symptoms

## 2020-10-27 NOTE — TELEPHONE ENCOUNTER
Breast History:  History of Previous Breast Biopsy:no  Self Breast Exams Completed:yes  Family History of Breast Cancer:no    Family History of Other Cancers:no   Ashkenazi Rastafari Decent:no  Bra Size: 36B    Gyne History:  : 5   Para: 3  Age of Menarche: 14 years  Age of Menopause: 45 years   Age of first live Birth: 23 years  History of Hysterectomy / CRUZ-BSO: No  History of OCP's/HRT:No    Family History or personal history of Ovarian Cancer: no

## 2020-10-27 NOTE — PATIENT INSTRUCTIONS
Mammogram results were discussed with patient today in office  Breast exam   Discuss surgery - After surgery - nothing strenuous for 1 week/ wear a bra for 1 week after surgery  Discuss Risk / pain management  Need COVID Screening  Need Pre Op  Signed Consent      Healthy Lifestyle Recommendations: healthy diet (decrease consumption of red meat, increase fresh fruits and vegetables), decreased alcohol consumption (less than 4 drinks/week), adequate sleep (goal 6-8 hours), routine exercise (goal 150 minutes/week or greater), weight control.

## 2020-11-11 ENCOUNTER — PATIENT MESSAGE (OUTPATIENT)
Dept: FAMILY MEDICINE CLINIC | Age: 50
End: 2020-11-11

## 2020-11-12 NOTE — PROGRESS NOTES
[facility] / [facility] ([facility]) [address], [address]    Acknowledgment of Informed Consent for Surgical or Medical Procedure and Sedation  I agree to allow doctor(s) [address]   and his/her associates or assistants, including residents and/or other qualified medical practitioner to perform the following medical treatment or procedure and to administer or direct the administration of sedation as necessary:  Procedure(s): LEFT BREAST EXCISIONAL BIOPSY  My doctor has explained the following regarding the proposed procedure:   the explanation of the procedure   the benefits of the procedure   the potential problems that might occur during recuperation   the risks and side effects of the procedure which could include but are not limited to severe blood loss, infection, stroke or death   the benefits, risks and side effect of alternative procedures including the consequences of declining this procedure or any alternative procedures   the likelihood of achieving satisfactory results. I acknowledge no guarantee or assurance has been made to me regarding the results. I understand that during the course of this treatment/procedure, unforeseen conditions can occur which require an additional or different procedure. I agree to allow my physician or assistants to perform such extension of the original procedure as they may find necessary. I understand that sedation will often result in temporary impairment of memory and fine motor skills and that sedation can occasionally progress to a state of deep sedation or general anesthesia. I understand the risks of anesthesia for surgery include, but are not limited to, sore throat, hoarseness, injury to face, mouth, or teeth; nausea; headache; injury to blood vessels or nerves; death, brain damage, or paralysis.     I understand that if I have a Limitation of Treatment order in effect during my hospitalization, the order may or may

## 2020-11-16 ENCOUNTER — OFFICE VISIT (OUTPATIENT)
Dept: FAMILY MEDICINE CLINIC | Age: 50
End: 2020-11-16
Payer: COMMERCIAL

## 2020-11-16 ENCOUNTER — OFFICE VISIT (OUTPATIENT)
Dept: PRIMARY CARE CLINIC | Age: 50
End: 2020-11-16
Payer: COMMERCIAL

## 2020-11-16 VITALS
TEMPERATURE: 97.2 F | HEART RATE: 65 BPM | WEIGHT: 142 LBS | HEIGHT: 62 IN | DIASTOLIC BLOOD PRESSURE: 72 MMHG | OXYGEN SATURATION: 98 % | BODY MASS INDEX: 26.13 KG/M2 | SYSTOLIC BLOOD PRESSURE: 112 MMHG

## 2020-11-16 PROCEDURE — 99211 OFF/OP EST MAY X REQ PHY/QHP: CPT | Performed by: NURSE PRACTITIONER

## 2020-11-16 PROCEDURE — 99243 OFF/OP CNSLTJ NEW/EST LOW 30: CPT | Performed by: FAMILY MEDICINE

## 2020-11-16 NOTE — PROGRESS NOTES
Preopertive Exam    Karyna Hickey   YOB: 1970    Date of Visit:  2020    Allergies   Allergen Reactions    Penicillins Anaphylaxis     Outpatient Medications Marked as Taking for the 20 encounter (Office Visit) with Danni Cabrera MD   Medication Sig Dispense Refill    rosuvastatin (CRESTOR) 5 MG tablet Take 1 tablet by mouth daily 90 tablet 3    tretinoin (RETIN-A) 0.025 % cream Apply a pea sized amount to the face QHS 20 g 4    Adalimumab (HUMIRA PEN) 40 MG/0.4ML PNKT INJECT 40 MG UNDER THE SKIN EVERY OTHER WEEK. 2 each 6    clobetasol (TEMOVATE) 0.05 % cream Apply to affected area BID PRN flares 60 g 2           Karyna Hickey (:  1970) has requested an evaluation for a preoperative visit in preparation for an upcoming surgery. Vitals:    20 1418   BP: 112/72   Site: Left Upper Arm   Position: Sitting   Cuff Size: Medium Adult   Pulse: 65   Temp: 97.2 °F (36.2 °C)   SpO2: 98%   Weight: 142 lb (64.4 kg)   Height: 5' 2\" (1.575 m)     Body mass index is 25.97 kg/m². Wt Readings from Last 3 Encounters:   20 142 lb (64.4 kg)   10/27/20 145 lb 12.8 oz (66.1 kg)   10/16/20 145 lb (65.8 kg)     BP Readings from Last 3 Encounters:   20 112/72   10/27/20 115/74   10/16/20 130/80        Chief Complaint   Patient presents with    Pre-op Exam     DOS: 2020: LEFT BREAST EXCISIONAL BIOPSY : Mariangel Coello MD        HPI:  This patient presents to the office today for a preoperative consultation at the request of surgeon, Dr. Néstor Martinez, who plans on performing L breast excisional biopsy. Patient denies any chest pain, palpitations, shortness of breath, or diaphoresis. Patient is able to go up a flight of stairs without any cardiac or respiratory symptoms. Psoriasis: doing well on humira. Seeing dermatology.      HLD:  Patient reports she had the be well labs this year. Stopped the lipitor as it made her nauseated.   Now on crestor since October.          SH: 10/2020: . Lives with son Areli Nash (patient here) and daughter Sameer. In a relationship with someone else and sexually active. Moving soon. 2016  Lives with spouse and 4 kids. Has 14yo, 12yo. Has 2 grown children. Past Medical History:   Diagnosis Date    Psoriasis        Past Surgical History:   Procedure Laterality Date    HERNIA REPAIR  2005     BREAST NEEDLE BIOPSY LEFT  10/19/2020     BREAST NEEDLE BIOPSY LEFT 10/19/2020 [facility] ULTRASOUND       Family History   Problem Relation Age of Onset    Cancer Mother         skin cancer       Social History     Socioeconomic History    Marital status:      Spouse name: Not on file    Number of children: Not on file    Years of education: Not on file    Highest education level: Not on file   Occupational History    Not on file   Social Needs    Financial resource strain: Not on file    Food insecurity     Worry: Not on file     Inability: Not on file    Transportation needs     Medical: Not on file     Non-medical: Not on file   Tobacco Use    Smoking status: Former Smoker     Packs/day: 1.00     Years: 10.00     Pack years: 10.00     Types: Cigarettes     Start date: 1992     Last attempt to quit: 2002     Years since quittin.8    Smokeless tobacco: Never Used   Substance and Sexual Activity    Alcohol use:  Yes     Alcohol/week: 0.0 standard drinks     Comment: socially    Drug use: No    Sexual activity: Yes     Partners: Male   Lifestyle    Physical activity     Days per week: Not on file     Minutes per session: Not on file    Stress: Not on file   Relationships    Social connections     Talks on phone: Not on file     Gets together: Not on file     Attends Yazdanism service: Not on file     Active member of club or organization: Not on file     Attends meetings of clubs or organizations: Not on file     Relationship status: Not on file    Intimate partner violence Fear of current or ex partner: Not on file     Emotionally abused: Not on file     Physically abused: Not on file     Forced sexual activity: Not on file   Other Topics Concern    Not on file   Social History Narrative    06/06/2016    Lives with spouse and 4 kids. Has 12yo, 10yo. Has 2 grown children. Review of Systems  A comprehensive review of systems was negative except for what was noted in the HPI. /72 (Site: Left Upper Arm, Position: Sitting, Cuff Size: Medium Adult)   Pulse 65   Temp 97.2 °F (36.2 °C)   Ht 5' 2\" (1.575 m)   Wt 142 lb (64.4 kg)   SpO2 98%   BMI 25.97 kg/m²     Physical Exam   Constitutional: She is oriented to person, place, and time. She appears well-developed and well-nourished. No distress. HENT:   Head: Normocephalic and atraumatic. Mouth/Throat: Uvula is midline, oropharynx is clear and moist and mucous membranes are normal.   Eyes: Conjunctivae and EOM are normal. Pupils are equal, round, and reactive to light. Neck: Trachea normal and normal range of motion. Neck supple. No mass and no thyromegaly present. Cardiovascular: Normal rate, regular rhythm, normal heart sounds. No M/R/G  Pulmonary/Chest: Effort normal and breath sounds normal. No respiratory distress. She has no wheezes. She has no rales. Abdominal: Soft. bowel sounds are normal. She exhibits no distension and no mass. No tenderness. Musculoskeletal: She exhibits no edema  Neurological: She is alert and oriented to person, place, and time. No cranial nerve deficit   Skin: Skin is warm and dry. No rash noted. No erythema. Psychiatric: She has a normal mood and affect. Her behavior is normal.          Assessment:       38 y.o. patient with planned surgery as above.     Known risk factors for perioperative complications: Psoriasis on Humira       Plan:     Preoperative workup as follows: none    Change in medication regimen before surgery: Discontinue NSAIDs (ibuprofen) 7 days before

## 2020-11-17 LAB — SARS-COV-2: NOT DETECTED

## 2020-11-18 ENCOUNTER — ANESTHESIA EVENT (OUTPATIENT)
Dept: OPERATING ROOM | Age: 50
End: 2020-11-18
Payer: COMMERCIAL

## 2020-11-18 RX ORDER — IBUPROFEN 200 MG
200 TABLET ORAL EVERY 6 HOURS PRN
COMMUNITY

## 2020-11-18 NOTE — PROGRESS NOTES
[facility] PRE-SURGICAL TESTING INSTRUCTIONS                              PRIOR TO PROCEDURE DATE:  1. Please follow any guidelines/instructions prior to your procedure as advised by your surgeon. 2. Arrange for someone to drive you home and be with you for the first 24 hours after discharge for your safety after your procedure for which you received sedation. Ensure it is someone we can share information with regarding your discharge. 3. You must contact your surgeon for instructions IF:   You are taking any blood thinners, aspirin, anti-inflammatory or vitamin E.   There is a change in your physical condition such as a cold, fever, rash, cuts, sores or any other infection, especially near your surgical site. 4. Do not drink alcohol the day before or day of your procedure. 5. A Pre-op History and Physical for surgery MUST be completed by your Physician or Urgent Care within 30 days of your procedure date. Please bring a copy with you on the day of your procedure and along with any other testing performed. THE DAY OF YOUR PROCEDURE:  1. Follow instructions for ARRIVAL TIME as DIRECTED BY YOUR SURGEON. I    2. Enter the MAIN entrance from [address] and follow the signs to the free [employer] or [employer] parking (offered free of charge 6am-5pm). 3. Enter the Main Entrance of the hospital (do not enter from the lower level of the parking garage). Upon entrance, check in with the  at the main desk on your left. If no one is available at the desk, proceed into the [facility] Waiting Room and go through the door directly into the [facility]. There is a Check-in desk ACROSS from Room 5 (marked with a sign hanging from the ceiling). The phone number for the surgery center is [phone]. 4. Please call [phone] option #2 option #2 if you have not been preregistered yet. On the day of your procedure bring your insurance card and photo ID.  You will be registered at your bedside once brought back to your room. 5. DO NOT EAT ANYTHING eight hours prior to surgery. May have 8 ounces of water 4 hours prior to surgery. 6. MEDICATIONS    Take the following medications with a SMALL sip of water: NONE   Use your usual dose of inhalers the morning of surgery. BRING your rescue inhaler with you to hospital.    Anesthesia does NOT want you to take insulin the morning of surgery. They will control your blood sugar while you are at the hospital. Please contact your ordering physician for instructions regarding your insulin the night before your procedure. If you have an insulin pump, please keep it set on basal rate. 7. Do not swallow water when brushing teeth. No gum, candy, mints or ice chips. Refrain from smoking or at least decrease the amount. 8. Dress in loose, comfortable clothing appropriate for redressing after your procedure. Do not wear jewelry (including body piercings), make-up (especially NO eye make-up), fingernail polish (NO toenail polish if foot/leg surgery), lotion, powders or metal hairclips. 9. Dentures, glasses, or contacts will need to be removed before your procedure. Bring cases for your glasses, contacts, dentures, or hearing aids to protect them while you are in surgery. 10. If you use a CPAP, please bring it with you on the day of your procedure. 11. We recommend that valuable personal  belongings such as cash, cell phones, e-tablets or jewelry, be left at home during your stay. The hospital will not be responsible for valuables that are not secured in the hospital safe. However, if your insurance requires a co-pay, you may want to bring a method of payment, i.e. Check or credit card, if you wish to pay your co-pay the day of surgery. 12. If you are to stay overnight, you may bring a bag with personal items.  Please have any large items you may need brought in by your family after your arrival to your hospital potential side effects. 2. For comfort and safety, arrange to have someone at home with you for the first 24 hours after discharge. 3. You and your family will be given written instructions about your diet, activity, dressing care, medications, and return visits. 4. Once at home, should issues with nausea, pain, or bleeding occur, or should you notice any signs of infection, you should call your surgeon. 5. Always clean your hands before and after caring for your wound. Do not let your family touch your surgery site without cleaning their hands. 6. Narcotic pain medications can cause significant constipation. You may want to add a stool softener to your postoperative medication schedule or speak to your surgeon on how best to manage this SIDE EFFECT. SPECIAL INSTRUCTIONS INFORMED NO ONE CAN BE WITH HER WHILE PREPARING FOR SURGERY, MAY WAIT IN WAITING ROOM HOWEVER LIMITED SPACE, MAY WAIT IN CAR. CALL SURGEON FOR ARRIVAL TIME. PATIENT ACKNOWLEDGES UNDERSTANDING OF INSTRUCTIONS. Thank you for allowing us to care for you. We strive to exceed your expectations in the delivery of care and service provided to you and your family. If you need to contact us for any reason, please call us at [phone]    Instructions reviewed with patient during preadmission testing phone interview. Mark Abbott. 11/18/2020 .12:37 PM      ADDITIONAL EDUCATIONAL INFORMATION REVIEWED PER PHONE WITH YOU AND/OR YOUR FAMILY:  Yes Bring a urine sample on day of surgery OR WILL OBTAIN ONE HERE   No Hibiclens® Bathing Instructions   Yes Antibacterial Soap

## 2020-11-19 ENCOUNTER — TELEPHONE (OUTPATIENT)
Dept: SURGERY | Age: 50
End: 2020-11-19

## 2020-11-19 NOTE — TELEPHONE ENCOUNTER
Spoke with patient regarding scheduled surgery on 11- with Dr. Ghazal Malhotra. Patient is asked to arrive by 6:30 AM @ [facility].  NPO after midnight. You will need someone to drive you home following this procedure. Please bring a Photo ID & Insurance card with you. Patient has seen PCP for Pre-op H & P on 11-. Surgery is scheduled to start at approx. 8:30 AM and should take approx. 60 minutes. Patient did obtain her Pre-Op Covid-19 Test on 11/16/2020. If the hospital needs any further information, someone will give you a call. Patient expressed a verbal understanding of these instructions and had no further questions at this time. Call ended.

## 2020-11-20 ENCOUNTER — HOSPITAL ENCOUNTER (OUTPATIENT)
Age: 50
Setting detail: OUTPATIENT SURGERY
Discharge: HOME | End: 2020-11-20
Attending: SURGERY | Admitting: SURGERY
Payer: COMMERCIAL

## 2020-11-20 ENCOUNTER — ANESTHESIA (OUTPATIENT)
Dept: OPERATING ROOM | Age: 50
End: 2020-11-20
Payer: COMMERCIAL

## 2020-11-20 VITALS — OXYGEN SATURATION: 97 % | SYSTOLIC BLOOD PRESSURE: 123 MMHG | DIASTOLIC BLOOD PRESSURE: 60 MMHG | TEMPERATURE: 95.2 F

## 2020-11-20 VITALS
OXYGEN SATURATION: 98 % | DIASTOLIC BLOOD PRESSURE: 60 MMHG | TEMPERATURE: 96.2 F | HEART RATE: 50 BPM | BODY MASS INDEX: 26.13 KG/M2 | RESPIRATION RATE: 12 BRPM | SYSTOLIC BLOOD PRESSURE: 99 MMHG | HEIGHT: 62 IN | WEIGHT: 142 LBS

## 2020-11-20 LAB — PREGNANCY, URINE: NEGATIVE

## 2020-11-20 PROCEDURE — 7100000010 HC PHASE II RECOVERY - FIRST 15 MIN: Performed by: SURGERY

## 2020-11-20 PROCEDURE — 2580000003 HC RX 258: Performed by: ANESTHESIOLOGY

## 2020-11-20 PROCEDURE — 3700000001 HC ADD 15 MINUTES (ANESTHESIA): Performed by: SURGERY

## 2020-11-20 PROCEDURE — 2709999900 HC NON-CHARGEABLE SUPPLY: Performed by: SURGERY

## 2020-11-20 PROCEDURE — 6360000002 HC RX W HCPCS: Performed by: NURSE ANESTHETIST, CERTIFIED REGISTERED

## 2020-11-20 PROCEDURE — 3700000000 HC ANESTHESIA ATTENDED CARE: Performed by: SURGERY

## 2020-11-20 PROCEDURE — 3600000014 HC SURGERY LEVEL 4 ADDTL 15MIN: Performed by: SURGERY

## 2020-11-20 PROCEDURE — 7100000000 HC PACU RECOVERY - FIRST 15 MIN: Performed by: SURGERY

## 2020-11-20 PROCEDURE — 2500000003 HC RX 250 WO HCPCS: Performed by: SURGERY

## 2020-11-20 PROCEDURE — 19120 REMOVAL OF BREAST LESION: CPT | Performed by: SURGERY

## 2020-11-20 PROCEDURE — 6360000002 HC RX W HCPCS: Performed by: SURGERY

## 2020-11-20 PROCEDURE — 88307 TISSUE EXAM BY PATHOLOGIST: CPT

## 2020-11-20 PROCEDURE — 7100000011 HC PHASE II RECOVERY - ADDTL 15 MIN: Performed by: SURGERY

## 2020-11-20 PROCEDURE — 2500000003 HC RX 250 WO HCPCS: Performed by: NURSE ANESTHETIST, CERTIFIED REGISTERED

## 2020-11-20 PROCEDURE — 3600000004 HC SURGERY LEVEL 4 BASE: Performed by: SURGERY

## 2020-11-20 PROCEDURE — 6370000000 HC RX 637 (ALT 250 FOR IP): Performed by: SURGERY

## 2020-11-20 PROCEDURE — 84703 CHORIONIC GONADOTROPIN ASSAY: CPT

## 2020-11-20 PROCEDURE — 7100000001 HC PACU RECOVERY - ADDTL 15 MIN: Performed by: SURGERY

## 2020-11-20 RX ORDER — PROPOFOL 10 MG/ML
INJECTION, EMULSION INTRAVENOUS CONTINUOUS PRN
Status: DISCONTINUED | OUTPATIENT
Start: 2020-11-20 | End: 2020-11-20 | Stop reason: SDUPTHER

## 2020-11-20 RX ORDER — OXYCODONE HYDROCHLORIDE AND ACETAMINOPHEN 5; 325 MG/1; MG/1
2 TABLET ORAL PRN
Status: DISCONTINUED | OUTPATIENT
Start: 2020-11-20 | End: 2020-11-20 | Stop reason: HOSPADM

## 2020-11-20 RX ORDER — HYDROCODONE BITARTRATE AND ACETAMINOPHEN 5; 325 MG/1; MG/1
1 TABLET ORAL EVERY 6 HOURS PRN
Qty: 5 TABLET | Refills: 0 | Status: SHIPPED | OUTPATIENT
Start: 2020-11-20 | End: 2020-11-23

## 2020-11-20 RX ORDER — ONDANSETRON 2 MG/ML
4 INJECTION INTRAMUSCULAR; INTRAVENOUS
Status: DISCONTINUED | OUTPATIENT
Start: 2020-11-20 | End: 2020-11-20 | Stop reason: HOSPADM

## 2020-11-20 RX ORDER — BUPIVACAINE HYDROCHLORIDE 5 MG/ML
INJECTION, SOLUTION EPIDURAL; INTRACAUDAL PRN
Status: DISCONTINUED | OUTPATIENT
Start: 2020-11-20 | End: 2020-11-20 | Stop reason: ALTCHOICE

## 2020-11-20 RX ORDER — FENTANYL CITRATE 50 UG/ML
50 INJECTION, SOLUTION INTRAMUSCULAR; INTRAVENOUS EVERY 5 MIN PRN
Status: DISCONTINUED | OUTPATIENT
Start: 2020-11-20 | End: 2020-11-20 | Stop reason: HOSPADM

## 2020-11-20 RX ORDER — MEPERIDINE HYDROCHLORIDE 25 MG/ML
12.5 INJECTION INTRAMUSCULAR; INTRAVENOUS; SUBCUTANEOUS EVERY 5 MIN PRN
Status: DISCONTINUED | OUTPATIENT
Start: 2020-11-20 | End: 2020-11-20 | Stop reason: HOSPADM

## 2020-11-20 RX ORDER — LIDOCAINE HYDROCHLORIDE 20 MG/ML
INJECTION, SOLUTION INFILTRATION; PERINEURAL PRN
Status: DISCONTINUED | OUTPATIENT
Start: 2020-11-20 | End: 2020-11-20 | Stop reason: SDUPTHER

## 2020-11-20 RX ORDER — CEFAZOLIN SODIUM 2 G/50ML
2 SOLUTION INTRAVENOUS
Status: DISCONTINUED | OUTPATIENT
Start: 2020-11-20 | End: 2020-11-20 | Stop reason: HOSPADM

## 2020-11-20 RX ORDER — DIPHENHYDRAMINE HYDROCHLORIDE 50 MG/ML
12.5 INJECTION INTRAMUSCULAR; INTRAVENOUS
Status: DISCONTINUED | OUTPATIENT
Start: 2020-11-20 | End: 2020-11-20 | Stop reason: HOSPADM

## 2020-11-20 RX ORDER — FENTANYL CITRATE 50 UG/ML
INJECTION, SOLUTION INTRAMUSCULAR; INTRAVENOUS PRN
Status: DISCONTINUED | OUTPATIENT
Start: 2020-11-20 | End: 2020-11-20 | Stop reason: SDUPTHER

## 2020-11-20 RX ORDER — LABETALOL 20 MG/4 ML (5 MG/ML) INTRAVENOUS SYRINGE
5 EVERY 10 MIN PRN
Status: DISCONTINUED | OUTPATIENT
Start: 2020-11-20 | End: 2020-11-20 | Stop reason: HOSPADM

## 2020-11-20 RX ORDER — HYDRALAZINE HYDROCHLORIDE 20 MG/ML
5 INJECTION INTRAMUSCULAR; INTRAVENOUS EVERY 10 MIN PRN
Status: DISCONTINUED | OUTPATIENT
Start: 2020-11-20 | End: 2020-11-20 | Stop reason: HOSPADM

## 2020-11-20 RX ORDER — PROCHLORPERAZINE EDISYLATE 5 MG/ML
5 INJECTION INTRAMUSCULAR; INTRAVENOUS
Status: DISCONTINUED | OUTPATIENT
Start: 2020-11-20 | End: 2020-11-20 | Stop reason: HOSPADM

## 2020-11-20 RX ORDER — FENTANYL CITRATE 50 UG/ML
25 INJECTION, SOLUTION INTRAMUSCULAR; INTRAVENOUS EVERY 5 MIN PRN
Status: DISCONTINUED | OUTPATIENT
Start: 2020-11-20 | End: 2020-11-20 | Stop reason: HOSPADM

## 2020-11-20 RX ORDER — MIDAZOLAM HYDROCHLORIDE 1 MG/ML
INJECTION INTRAMUSCULAR; INTRAVENOUS PRN
Status: DISCONTINUED | OUTPATIENT
Start: 2020-11-20 | End: 2020-11-20 | Stop reason: SDUPTHER

## 2020-11-20 RX ORDER — PROPOFOL 10 MG/ML
INJECTION, EMULSION INTRAVENOUS PRN
Status: DISCONTINUED | OUTPATIENT
Start: 2020-11-20 | End: 2020-11-20 | Stop reason: SDUPTHER

## 2020-11-20 RX ORDER — SODIUM CHLORIDE, SODIUM LACTATE, POTASSIUM CHLORIDE, CALCIUM CHLORIDE 600; 310; 30; 20 MG/100ML; MG/100ML; MG/100ML; MG/100ML
INJECTION, SOLUTION INTRAVENOUS CONTINUOUS
Status: DISCONTINUED | OUTPATIENT
Start: 2020-11-20 | End: 2020-11-20 | Stop reason: HOSPADM

## 2020-11-20 RX ORDER — OXYCODONE HYDROCHLORIDE AND ACETAMINOPHEN 5; 325 MG/1; MG/1
1 TABLET ORAL PRN
Status: DISCONTINUED | OUTPATIENT
Start: 2020-11-20 | End: 2020-11-20 | Stop reason: HOSPADM

## 2020-11-20 RX ORDER — ACETAMINOPHEN 325 MG/1
650 TABLET ORAL
Status: COMPLETED | OUTPATIENT
Start: 2020-11-20 | End: 2020-11-20

## 2020-11-20 RX ADMIN — PROPOFOL 50 MCG/KG/MIN: 10 INJECTION, EMULSION INTRAVENOUS at 08:49

## 2020-11-20 RX ADMIN — LIDOCAINE HYDROCHLORIDE 100 MG: 20 INJECTION, SOLUTION INFILTRATION; PERINEURAL at 08:45

## 2020-11-20 RX ADMIN — FENTANYL CITRATE 100 MCG: 50 INJECTION INTRAMUSCULAR; INTRAVENOUS at 08:40

## 2020-11-20 RX ADMIN — MIDAZOLAM HYDROCHLORIDE 2 MG: 2 INJECTION, SOLUTION INTRAMUSCULAR; INTRAVENOUS at 08:37

## 2020-11-20 RX ADMIN — PROPOFOL 50 MG: 10 INJECTION, EMULSION INTRAVENOUS at 08:45

## 2020-11-20 RX ADMIN — ACETAMINOPHEN 650 MG: 325 TABLET ORAL at 07:09

## 2020-11-20 RX ADMIN — SODIUM CHLORIDE, SODIUM LACTATE, POTASSIUM CHLORIDE, AND CALCIUM CHLORIDE: 600; 310; 30; 20 INJECTION, SOLUTION INTRAVENOUS at 09:07

## 2020-11-20 RX ADMIN — CEFAZOLIN SODIUM 2 G: 2 SOLUTION INTRAVENOUS at 08:37

## 2020-11-20 RX ADMIN — SODIUM CHLORIDE, SODIUM LACTATE, POTASSIUM CHLORIDE, AND CALCIUM CHLORIDE: 600; 310; 30; 20 INJECTION, SOLUTION INTRAVENOUS at 07:20

## 2020-11-20 ASSESSMENT — PULMONARY FUNCTION TESTS
PIF_VALUE: 1
PIF_VALUE: 0
PIF_VALUE: 1
PIF_VALUE: 0
PIF_VALUE: 0
PIF_VALUE: 1
PIF_VALUE: 0
PIF_VALUE: 0
PIF_VALUE: 1
PIF_VALUE: 3
PIF_VALUE: 1
PIF_VALUE: 0
PIF_VALUE: 1
PIF_VALUE: 0
PIF_VALUE: 1
PIF_VALUE: 0
PIF_VALUE: 1
PIF_VALUE: 0
PIF_VALUE: 1

## 2020-11-20 ASSESSMENT — PAIN - FUNCTIONAL ASSESSMENT: PAIN_FUNCTIONAL_ASSESSMENT: 0-10

## 2020-11-20 ASSESSMENT — ENCOUNTER SYMPTOMS: SHORTNESS OF BREATH: 0

## 2020-11-20 ASSESSMENT — LIFESTYLE VARIABLES: SMOKING_STATUS: 0

## 2020-11-20 ASSESSMENT — PAIN SCALES - GENERAL
PAINLEVEL_OUTOF10: 0
PAINLEVEL_OUTOF10: 0

## 2020-11-20 NOTE — ANESTHESIA POSTPROCEDURE EVALUATION
Department of Anesthesiology  Postprocedure Note    Patient: Simi Gomez  MRN: [id number]  YOB: 1970  Date of evaluation: 11/20/2020  Time:  10:23 AM     Procedure Summary     Date:  11/20/20 Room / Location:  [facility]    Anesthesia Start:  8450 Anesthesia Stop:  8309    Procedure:  LEFT BREAST EXCISIONAL BIOPSY (Left ) Diagnosis:       Mass of left breast      (Mass of left breast [N63.20])    Surgeon:  Donte Persaud MD Responsible Provider:      Anesthesia Type:  MAC ASA Status:  2          Anesthesia Type: MAC    Lucas Phase I: Lucas Score: 10    Lucas Phase II:      Last vitals: Reviewed and per EMR flowsheets.        Anesthesia Post Evaluation    Patient location during evaluation: PACU  Patient participation: complete - patient participated  Level of consciousness: awake  Pain score: 2  Airway patency: patent  Nausea & Vomiting: no nausea and no vomiting  Complications: no  Cardiovascular status: hemodynamically stable  Respiratory status: acceptable  Hydration status: euvolemic

## 2020-11-20 NOTE — ANESTHESIA PRE PROCEDURE
Department of Anesthesiology  Preprocedure Note       Name:  Jefferson Santos   Age:  45 y.o.  :  1970                                          MRN:  [id number]         Date:  2020      Surgeon: Tena Mahoney): Nadine Valerio MD    Procedure: Procedure(s):  LEFT BREAST EXCISIONAL BIOPSY    Medications prior to admission:   Prior to Admission medications    Medication Sig Start Date End Date Taking? Authorizing Provider   rosuvastatin (CRESTOR) 5 MG tablet Take 1 tablet by mouth daily 10/16/20  Yes Juan Luis Mejía MD   tretinoin (RETIN-A) 0.025 % cream Apply a pea sized amount to the face QHS 20  Yes Anuj Matt MD   Adalimumab (HUMIRA PEN) 40 MG/0.4ML PNKT INJECT 40 MG UNDER THE SKIN EVERY OTHER WEEK. 20  Yes Anuj Matt MD   clobetasol (TEMOVATE) 0.05 % cream Apply to affected area BID PRN flares 19  Yes Anuj Matt MD   ibuprofen (ADVIL;MOTRIN) 200 MG tablet Take 200 mg by mouth every 6 hours as needed for Pain    Historical Provider, MD       Current medications:    Current Facility-Administered Medications   Medication Dose Route Frequency Provider Last Rate Last Dose    lactated ringers infusion   Intravenous Continuous Norberto Hernadez  mL/hr at 20 0720      ceFAZolin (ANCEF) 2 g in dextrose 3 % 50 mL IVPB (duplex)  2 g Intravenous 30 Min Pre-Op Nadine Valerio MD           Allergies:     Allergies   Allergen Reactions    Penicillins Anaphylaxis       Problem List:    Patient Active Problem List   Diagnosis Code    Psoriasis L40.9    Hyperlipidemia E78.5    Left breast mass N63.20       Past Medical History:        Diagnosis Date    Arthritis     THUMBS BILATERALLY    History of blood transfusion     Hyperlipidemia     Psoriasis        Past Surgical History:        Procedure Laterality Date    HERNIA REPAIR  2005    US BREAST NEEDLE BIOPSY LEFT  10/19/2020    US BREAST NEEDLE BIOPSY LEFT 10/19/2020 [facility] ULTRASOUND Social History:    Social History     Tobacco Use    Smoking status: Former Smoker     Packs/day: 1.00     Years: 10.00     Pack years: 10.00     Types: Cigarettes     Start date: 1992     Last attempt to quit: 2002     Years since quittin.8    Smokeless tobacco: Never Used   Substance Use Topics    Alcohol use: Yes     Alcohol/week: 1.0 standard drinks     Types: 1 Glasses of wine per week     Comment: socially EVERY OTHER DAY                                 Counseling given: Not Answered      Vital Signs (Current):   Vitals:    20 1209 20 0712   BP:  99/67   Pulse:  57   Resp:  14   Temp:  97.8 °F (36.6 °C)   TempSrc:  Oral   SpO2:  97%   Weight: 142 lb (64.4 kg) 142 lb (64.4 kg)   Height: 5' 2\" (1.575 m) 5' 2\" (1.575 m)                                              BP Readings from Last 3 Encounters:   20 99/67   20 112/72   10/27/20 115/74       NPO Status: Time of last liquid consumption:                         Time of last solid consumption:                         Date of last liquid consumption: 20                        Date of last solid food consumption: 20    BMI:   Wt Readings from Last 3 Encounters:   20 142 lb (64.4 kg)   20 142 lb (64.4 kg)   10/27/20 145 lb 12.8 oz (66.1 kg)     Body mass index is 25.97 kg/m².     CBC:   Lab Results   Component Value Date    WBC 8.5 2020    RBC 4.87 2020    HGB 15.0 2020    HCT 45.5 2020    MCV 93.5 2020    RDW 13.9 2020     2020       CMP:   Lab Results   Component Value Date     2020    K 4.6 2020     2020    CO2 26 2020    BUN 10 2020    CREATININE 0.8 2020    GFRAA >60 2020    AGRATIO 1.9 10/04/2019    LABGLOM >60 2020    GLUCOSE 85 2020    PROT 7.0 2020    CALCIUM 9.7 2020    BILITOT 0.3 2020    ALKPHOS 66 2020    AST 17 2020    ALT 15

## 2020-11-20 NOTE — H&P
Vickie Mendiola    [id number]    [facility] Same Day Surgery Update H & P  Department of General Surgery   Surgical Service   Pre-operative History and Physical  Last H & P within the last 30 days. DIAGNOSIS:   Mass of left breast [N63.20]    Procedure(s):  LEFT BREAST EXCISIONAL BIOPSY     HISTORY OF PRESENT ILLNESS:   Patient with left breast mass, which has been increasing in size. Core biopsy is consistent with lipoma but excision was recommended due to large size raising the possibility of sampling error. Covid 19:  Patient denies fever, chills, cough or known exposure to Covid-19. Past Medical History:        Diagnosis Date    Arthritis     THUMBS BILATERALLY    History of blood transfusion     Hyperlipidemia     Psoriasis      Past Surgical History:        Procedure Laterality Date    HERNIA REPAIR  2005     BREAST NEEDLE BIOPSY LEFT  10/19/2020     BREAST NEEDLE BIOPSY LEFT 10/19/2020 [facility]TRAS[facility]     Past Social History:  Social History     Socioeconomic History    Marital status:      Spouse name: None    Number of children: None    Years of education: None    Highest education level: None   Occupational History    None   Social Needs    Financial resource strain: None    Food insecurity     Worry: None     Inability: None    Transportation needs     Medical: None     Non-medical: None   Tobacco Use    Smoking status: Former Smoker     Packs/day: 1.00     Years: 10.00     Pack years: 10.00     Types: Cigarettes     Start date: 1992     Last attempt to quit: 2002     Years since quittin.8    Smokeless tobacco: Never Used   Substance and Sexual Activity    Alcohol use:  Yes     Alcohol/week: 1.0 standard drinks     Types: 1 Glasses of wine per week     Comment: socially EVERY OTHER DAY     Drug use: No    Sexual activity: Yes     Partners: Male   Lifestyle    Physical activity     Days per week: None     Minutes per session: None  Stress: None   Relationships    Social connections     Talks on phone: None     Gets together: None     Attends Oriental orthodox service: None     Active member of club or organization: None     Attends meetings of clubs or organizations: None     Relationship status: None    Intimate partner violence     Fear of current or ex partner: None     Emotionally abused: None     Physically abused: None     Forced sexual activity: None   Other Topics Concern    None   Social History Narrative    06/06/2016    Lives with spouse and 4 kids. Has 12yo, 12yo. Has 2 grown children. Medications Prior to Admission:      Prior to Admission medications    Medication Sig Start Date End Date Taking? Authorizing Provider   rosuvastatin (CRESTOR) 5 MG tablet Take 1 tablet by mouth daily 10/16/20  Yes Gregg Ford MD   tretinoin (RETIN-A) 0.025 % cream Apply a pea sized amount to the face QHS 7/25/20  Yes Renee Blakely MD   Adalimumab (HUMIRA PEN) 40 MG/0.4ML PNKT INJECT 40 MG UNDER THE SKIN EVERY OTHER WEEK. 6/25/20  Yes Renee Blakely MD   clobetasol (TEMOVATE) 0.05 % cream Apply to affected area BID PRN flares 2/20/19  Yes Renee Blakely MD   ibuprofen (ADVIL;MOTRIN) 200 MG tablet Take 200 mg by mouth every 6 hours as needed for Pain    Historical Provider, MD         Allergies:  Penicillins    PHYSICAL EXAM:      BP 99/67   Pulse 57   Temp 97.8 °F (36.6 °C) (Oral)   Resp 14   Ht 5' 2\" (1.575 m)   Wt 142 lb (64.4 kg)   SpO2 97%   BMI 25.97 kg/m²      Airway:  Airway patent with no audible stridor    Heart:  Bradycardic, regular rhythm, No murmur noted    Lungs:  No increased work of breathing, good air exchange, clear to auscultation bilaterally, no crackles or wheezing    Abdomen:  soft, non-distended, non-tender, no rebound tenderness or guarding, normal active bowel sounds and no masses palpated    ASSESSMENT AND PLAN     Patient is a 45 y.o. female with above specified procedure planned.     1.

## 2020-11-20 NOTE — PROGRESS NOTES
Patient to Pacu 7, s/p LEFT BREAST EXCISIONAL BIOPSY - Left, report received from CRNA, reported hemodynamically stable inta op. Oral airway removed after arrival, nasal cannula at 3L. Patient awake and talking.

## 2020-11-20 NOTE — OP NOTE
Operative Note      Patient: Divine Varela  YOB: 1970  MRN: [id number]    Date of Procedure: 11/20/2020    Pre-Op Diagnosis: Mass of left breast [N63.20]    Post-Op Diagnosis: Same       Procedure(s):  LEFT BREAST EXCISIONAL BIOPSY    Surgeon(s): Lashon Edwards MD    Assistant:   Surgical Assistant: Barb Orellana    Anesthesia: Monitor Anesthesia Care    Estimated Blood Loss (mL): Minimal    Complications: None    Specimens:   ID Type Source Tests Collected by Time Destination   A : LEFT BREAST MASS Tissue Tissue SURGICAL PATHOLOGY Lashon Edwards MD 11/20/2020 0901        Implants:  * No implants in log *      Drains: * No LDAs found *    Findings: see op note    Detailed Description of Procedure:   Operative Report      Name:  Divine Varela   MRN:  [id number]  Date:  11/20/2020        PREOPERATIVE DIAGNOSIS: left breast mass    POSTOPERATIVE DIAGNOSIS: same    PROCEDURE:left breast excisional biopsy    SURGEON: Rashmi    ESTIMATED BLOOD LOSS:  Less than 25 mL    ASSISTANT: RICO Pearson    ANESTHESIA: MAC.    INDICATIONS FOR PROCEDURE: The patient is a 45 y.o. female who had  presented with a left breast mass, and needle biopsy showed a lipoma. She is here now for excision of the large mass. The risks, benefits and alternatives were discussed with the  patient. Questions were answered and she is agreeable to proceed. DESCRIPTION OF OPERATION: The patient was brought to the operating room and  placed on the OR table in the supine position. She was given IV sedation and the left breast and axillary region were prepped and draped in the usual sterile fashion. The area was injected with local anesthetic. An incision was made in the lower outer quadrant on the left breast and carried down through the skin and subcutaneous tissues. The mass was identified, and this was dissected free from the surrounding tissue using cautery and sent to pathology for permanent section.  The wound was irrigated and injected with local anesthetic, and  closed with a deep layer of vicryl and skin was re-approximated with 4-0 Monocryl and covered with surgical glue. Sponge, needle and instrument counts were correct per nursing. The patient tolerated the procedure well. She was taken to the  recovery room in stable condition.         Electronically signed by Ulises Abreu MD on 11/20/2020 at 9:11 AM           Electronically signed by Ulises Abreu MD on 11/20/2020 at 9:11 AM

## 2020-11-20 NOTE — PROGRESS NOTES
Ambulatory Surgery/Procedure Discharge Note    Vitals:    11/20/20 1021   BP: 99/60   Pulse: 50   Resp: 12   Temp: 96.2 °F (35.7 °C)   SpO2: 98%       In: 1050 [P.O.:200; I.V.:850]  Out: -     Restroom use offered before discharge. Yes  voided    Pain assessment:  level of pain (1-10, 10 severe), incision clean and dry has ice bag and bra on d/c inst to pt. And family  Pain Level: 0        Patient discharged to home/self care.  Patient discharged via wheel chair by transporter to waiting family/S.O.       11/20/2020 11:02 AM

## 2020-12-01 ENCOUNTER — TELEPHONE (OUTPATIENT)
Dept: DERMATOLOGY | Age: 50
End: 2020-12-01

## 2020-12-01 NOTE — TELEPHONE ENCOUNTER
Spoke with pharmacy staff, PA sent via cover my meds, currently waiting on for approval or denial letter.

## 2020-12-01 NOTE — TELEPHONE ENCOUNTER
Dr Salina Schilder patient  [facility] c/b #[phone]  Jigna Stanton stated  Calling in regards to a prior authorization for patient Adalimumab (HUMIRA PEN) 40 MG/0.4ML PNKT. Seeing if PA has been started.   Please c/b to discuss

## 2021-01-27 ENCOUNTER — OFFICE VISIT (OUTPATIENT)
Dept: DERMATOLOGY | Age: 51
End: 2021-01-27
Payer: COMMERCIAL

## 2021-01-27 VITALS — TEMPERATURE: 97 F

## 2021-01-27 DIAGNOSIS — Z79.899 HIGH RISK MEDICATION USE: ICD-10-CM

## 2021-01-27 DIAGNOSIS — L40.9 PSORIASIS: Primary | ICD-10-CM

## 2021-01-27 PROCEDURE — 99214 OFFICE O/P EST MOD 30 MIN: CPT | Performed by: DERMATOLOGY

## 2021-01-27 NOTE — PROGRESS NOTES
[facility]) Dermatology  Sadie Duque M.D.  [address] [place] [address]  1970    45 y.o. female     Date of Visit: 1/27/2021    Chief Complaint:   Chief Complaint   Patient presents with    Psoriasis     Humira        I was asked to see this patient by Dr. Esparza ref. provider found. History of Present Illness:  1. Follow-up of Humira. Patient has been on Humira since December 2016. Continues to have psoriatic plaques on her elbows-otherwise completely clear. Does have clobetasol that she rarely uses. Uses only a moisturizer. Plaques are relatively thin and asymptomatic. Denies side effects to Humira-injection site tenderness much improved on citrate free formula. Daughter has been diagnosed with melanoma in situ, mother with a history of melanoma. Skin history:     Psoriasis-Humira since December 2016. Residual psoriasis elbows     No personal history of skin cancer. + History of actinic keratoses-liquid nitrogen     Mother, daughter with a history of melanoma     Former tanning bed user. Now does practice sun avoidance and wear sunscreen    Review of Systems:  Constitutional: Reports general sense of well-being       Past Medical History, Surgical History, Family History, Medications and Allergies reviewed.     Social History:   Social History     Socioeconomic History    Marital status:      Spouse name: Not on file    Number of children: Not on file    Years of education: Not on file    Highest education level: Not on file   Occupational History    Not on file   Social Needs    Financial resource strain: Not on file    Food insecurity     Worry: Not on file     Inability: Not on file    Transportation needs     Medical: Not on file     Non-medical: Not on file   Tobacco Use    Smoking status: Former Smoker     Packs/day: 1.00     Years: 10.00     Pack years: 10.00     Types: Cigarettes     Start date: 2/1/1992     Quit date: 2/1/2002 Years since quittin.0    Smokeless tobacco: Never Used   Substance and Sexual Activity    Alcohol use: Yes     Alcohol/week: 1.0 standard drinks     Types: 1 Glasses of wine per week     Comment: socially EVERY OTHER DAY     Drug use: No    Sexual activity: Yes     Partners: Male   Lifestyle    Physical activity     Days per week: Not on file     Minutes per session: Not on file    Stress: Not on file   Relationships    Social connections     Talks on phone: Not on file     Gets together: Not on file     Attends Yarsani service: Not on file     Active member of club or organization: Not on file     Attends meetings of clubs or organizations: Not on file     Relationship status: Not on file    Intimate partner violence     Fear of current or ex partner: Not on file     Emotionally abused: Not on file     Physically abused: Not on file     Forced sexual activity: Not on file   Other Topics Concern    Not on file   Social History Narrative    2016    Lives with spouse and 4 kids. Has 12yo, 12yo. Has 2 grown children. Physical Examination       -General: Well-appearing, NAD  1. Limited exam  Erythematous scaly plaques both elbows      Assessment and Plan     1. Psoriasis-moisturizer for elbows or clobetasol twice daily as needed if they become bothersome. Patient happy with control currently. Continue Humira. Discussed Covid vaccine on [employer]olog[employer]-okay to proceed. 2. High risk medication use - -Reviewed risks of injection-site reaction, flu-like symptoms, immunosuppression (serious infection, reactivation of latent TB), leukopenia, increased risk of MACE events, lymphoma/malignancy, parasthesia/MS changes, autoimmune disease.  -Baseline labs: CBC/diff, renal, LFTs  -Maintenance labs: LFTs q6mo. CBC/diff, renal annually.  -Annual PPD. Last PPD: QuantiFERON gold     Due for labs this spring.   Patient aware

## 2021-03-23 ENCOUNTER — TELEPHONE (OUTPATIENT)
Dept: FAMILY MEDICINE CLINIC | Age: 51
End: 2021-03-23

## 2021-03-23 ENCOUNTER — VIRTUAL VISIT (OUTPATIENT)
Dept: FAMILY MEDICINE CLINIC | Age: 51
End: 2021-03-23
Payer: COMMERCIAL

## 2021-03-23 ENCOUNTER — NURSE TRIAGE (OUTPATIENT)
Dept: OTHER | Facility: CLINIC | Age: 51
End: 2021-03-23

## 2021-03-23 DIAGNOSIS — K13.70 MOUTH LESION: Primary | ICD-10-CM

## 2021-03-23 PROCEDURE — 99213 OFFICE O/P EST LOW 20 MIN: CPT | Performed by: FAMILY MEDICINE

## 2021-03-23 ASSESSMENT — PATIENT HEALTH QUESTIONNAIRE - PHQ9
SUM OF ALL RESPONSES TO PHQ QUESTIONS 1-9: 0
2. FEELING DOWN, DEPRESSED OR HOPELESS: 0
SUM OF ALL RESPONSES TO PHQ QUESTIONS 1-9: 0
SUM OF ALL RESPONSES TO PHQ QUESTIONS 1-9: 0
1. LITTLE INTEREST OR PLEASURE IN DOING THINGS: 0

## 2021-03-23 NOTE — PROGRESS NOTES
TELEHEALTH EVALUATION -- Audio/Visual (During DRMWY-91 public health emergency)    Shaheen Shipman   YOB: 1970    Date of Visit:  3/23/2021    Allergies   Allergen Reactions    Penicillins Anaphylaxis     Current Outpatient Medications on File Prior to Visit   Medication Sig Dispense Refill    Adalimumab (HUMIRA PEN) 40 MG/0.4ML PNKT INJECT 40 MG UNDER THE SKIN EVERY 2 WEEKS 2 each 6    ibuprofen (ADVIL;MOTRIN) 200 MG tablet Take 200 mg by mouth every 6 hours as needed for Pain      rosuvastatin (CRESTOR) 5 MG tablet Take 1 tablet by mouth daily 90 tablet 3    tretinoin (RETIN-A) 0.025 % cream Apply a pea sized amount to the face QHS 20 g 4    clobetasol (TEMOVATE) 0.05 % cream Apply to affected area BID PRN flares 60 g 2    [DISCONTINUED] Adalimumab (HUMIRA PEN) 40 MG/0.4ML PNKT INJECT 40 MG UNDER THE SKIN EVERY OTHER WEEK. 2 each 6     No current facility-administered medications on file prior to visit. Wt Readings from Last 3 Encounters:   20 142 lb (64.4 kg)   20 142 lb (64.4 kg)   10/27/20 145 lb 12.8 oz (66.1 kg)     BP Readings from Last 3 Encounters:   20 123/60   20 99/60   20 112/72          Bette Weber (:  1970) has requested to and consented to an audio/video evaluation for the concerns or medical issues discussed below. Chief Complaint   Patient presents with    Rash     Has a patch on her tonuge and it's discolored. Has been there for about a month       HPI  \" Anastasia\"    Patient c/o a spot on her tongue. Present for 1 month. No change since onset. It is sore. Hurts if she eats something crunchy or hard. No pain with spicy or salty. Last saw her dentist awhile ago. No other spots in her mouth. No recent antibiotic use. No systemic symptoms. History of smoking- quit 6 years ago.      Psoriasis: doing well on humira. Seeing dermatology.      HLD:  Patient reports she had the be well labs this year.  Stopped the lipitor as it made her nauseated. Now on crestor since October.      L breast mass: s/p excisional biopsy 2020- benign.         SH: 10/2020: . Chloe Trinidad with son Carlyle Navarro (patient here) and daughter Demetrius Polo. In a relationship with someone else and sexually active. Bernadette elliott. 2016  Lives with spouse and 4 kids.  Has 11yo, 6yo.  Has 2 grown children. Social History     Socioeconomic History    Marital status:      Spouse name: Not on file    Number of children: Not on file    Years of education: Not on file    Highest education level: Not on file   Occupational History    Not on file   Social Needs    Financial resource strain: Not on file    Food insecurity     Worry: Not on file     Inability: Not on file    Transportation needs     Medical: Not on file     Non-medical: Not on file   Tobacco Use    Smoking status: Former Smoker     Packs/day: 1.00     Years: 10.00     Pack years: 10.00     Types: Cigarettes     Start date: 1992     Quit date: 2002     Years since quittin.1    Smokeless tobacco: Never Used   Substance and Sexual Activity    Alcohol use:  Yes     Alcohol/week: 1.0 standard drinks     Types: 1 Glasses of wine per week     Comment: socially EVERY OTHER DAY     Drug use: No    Sexual activity: Yes     Partners: Male   Lifestyle    Physical activity     Days per week: Not on file     Minutes per session: Not on file    Stress: Not on file   Relationships    Social connections     Talks on phone: Not on file     Gets together: Not on file     Attends Confucianism service: Not on file     Active member of club or organization: Not on file     Attends meetings of clubs or organizations: Not on file     Relationship status: Not on file    Intimate partner violence     Fear of current or ex partner: Not on file     Emotionally abused: Not on file     Physically abused: Not on file     Forced sexual activity: Not on file   Other Topics Concern    Not on file   Social History Narrative    06/06/2016    Lives with spouse and 4 kids. Has 14yo, 12yo. Has 2 grown children. Review of Systems  As documented in the HPI. Currently no complaints of CP or MARIAM. Vital Signs: (As obtained by patient/caregiver or practitioner observation)    There were no vitals taken for this visit. Patient-Reported Vitals 3/23/2021   Patient-Reported Weight 140 lbs   Patient-Reported Height 5'2        Physical Exam  Constitutional:       General: She is not in acute distress. Appearance: Normal appearance. She is not ill-appearing. HENT:      Head: Normocephalic and atraumatic. Nose: Nose normal.   Pulmonary:      Effort: Pulmonary effort is normal. No respiratory distress. Skin:     Comments: Whitish patch L side of the tongue. Picture is slightly blurry with the camera. Neurological:      General: No focal deficit present. Mental Status: She is alert. Psychiatric:         Mood and Affect: Mood normal.         Behavior: Behavior normal.           Assessment/Plan     1. Mouth lesion  Stable but persistent. Trial of nystatin- advised to swish and spit QID for possible fungal infection. Avoid eating exacerbating foods. If does not go away f/u with her derm or dentist.    - nystatin (MYCOSTATIN) 879175 UNIT/ML suspension; Take 5 mLs by mouth 4 times daily  Dispense: 1 Bottle; Refill: 0      Discussed medications with patient, who voiced understanding of their use and indications. All questions answered. Return in about 7 months (around 10/23/2021) for Physical.         Ed Hill is being evaluated by a Virtual Visit (video visit) encounter to address concerns as mentioned above. A caregiver was present when appropriate. Due to this being a TeleHealth encounter (During XFIOQ-95 public health emergency), evaluation of the following organ systems was limited: Vitals/Constitutional/EENT/Resp/CV/GI//MS/Neuro/Skin/Heme-Lymph-Imm.   Pursuant to the emergency declaration under the [facility], [address] [place] [address] authority and the [employer] Act, this Virtual Visit was conducted with patient's (and/or legal guardian's) consent, to reduce the patient's risk of exposure to COVID-19 and provide necessary medical care. The patient (and/or legal guardian) has also been advised to contact this office for worsening conditions or problems, and seek emergency medical treatment and/or call 911 if deemed necessary. The patient and no one were present for the visit. Patient identification was verified at the start of the visit: Yes    Total time spent on this encounter: Not billed by time. Services were provided through a video synchronous discussion virtually to substitute for in-person clinic visit. Documentation was done using voice recognition dragon software. Efforts were made to ensure accuracy; however, inadvertent, unintentional computerized transcription errors may be present. --Saira Castano MD on 3/23/2021    An electronic signature was used to authenticate this note.

## 2021-03-23 NOTE — TELEPHONE ENCOUNTER
----- Message from Sara Schwartz sent at 3/23/2021 10:34 AM EDT -----  Subject: Appointment Request    Reason for Call: Semi-Routine Return from RN Triage    QUESTIONS  Type of Appointment? Established Patient  Reason for appointment request? Available appointments did not meet   patient need  Additional Information for Provider? Patient was transfer from RN to be   seen in the next 3 days. Patient has a patch on the side of her tongue   screened green. ---------------------------------------------------------------------------  --------------  [place] FOOTE  What is the best way for the office to contact you? OK to leave message on   voicemail  Preferred Call Back Phone Number? [phone]  ---------------------------------------------------------------------------  --------------  SCRIPT ANSWERS  Patient needs to be seen within 5 days? Yes  Nurse Name? Romayne Cam  Have you been diagnosed with COVID-19 (Coronavirus)   tested for COVID-19 (Coronavirus)   or told that you are suspected of having COVID-19 (Coronavirus)? No  Have you had a fever or taken medication to treat a fever within the past   3 days? No  Have you had a cough   shortness of breath or flu-like symptoms within the past 3 days? No  Do you currently have flu-like symptoms including fever or chills   cough   shortness of breath   or difficulty breathing   or new loss of taste or smell? No  (Service Expert  click yes below to proceed with [facility] As Usual   Scheduling)?  Yes

## 2021-03-24 NOTE — TELEPHONE ENCOUNTER
Spoke with Abby Peña at [facility] and he is asking how many days pt needs to use nystatin?

## 2021-03-24 NOTE — TELEPHONE ENCOUNTER
Pharmacy wants to verify the Quantity on the Nystatin.  They state this is unusually large amount than normal.

## 2021-07-06 RX ORDER — ADALIMUMAB 40MG/0.4ML
KIT SUBCUTANEOUS
Qty: 2 EACH | Refills: 6 | Status: SHIPPED | OUTPATIENT
Start: 2021-07-06 | End: 2022-01-04

## 2021-07-26 ENCOUNTER — OFFICE VISIT (OUTPATIENT)
Dept: DERMATOLOGY | Age: 51
End: 2021-07-26
Payer: COMMERCIAL

## 2021-07-26 VITALS — TEMPERATURE: 98 F

## 2021-07-26 DIAGNOSIS — L40.9 PSORIASIS: ICD-10-CM

## 2021-07-26 DIAGNOSIS — L40.9 PSORIASIS: Primary | ICD-10-CM

## 2021-07-26 DIAGNOSIS — Z79.899 HIGH RISK MEDICATION USE: ICD-10-CM

## 2021-07-26 DIAGNOSIS — D22.9 BENIGN NEVUS: ICD-10-CM

## 2021-07-26 LAB
ALBUMIN SERPL-MCNC: 4.5 G/DL (ref 3.4–5)
ALP BLD-CCNC: 71 U/L (ref 40–129)
ALT SERPL-CCNC: 15 U/L (ref 10–40)
ANION GAP SERPL CALCULATED.3IONS-SCNC: 14 MMOL/L (ref 3–16)
AST SERPL-CCNC: 15 U/L (ref 15–37)
BILIRUB SERPL-MCNC: 0.3 MG/DL (ref 0–1)
BILIRUBIN DIRECT: <0.2 MG/DL (ref 0–0.3)
BILIRUBIN, INDIRECT: NORMAL MG/DL (ref 0–1)
BUN BLDV-MCNC: 13 MG/DL (ref 7–20)
CALCIUM SERPL-MCNC: 9.7 MG/DL (ref 8.3–10.6)
CHLORIDE BLD-SCNC: 103 MMOL/L (ref 99–110)
CO2: 26 MMOL/L (ref 21–32)
CREAT SERPL-MCNC: 0.8 MG/DL (ref 0.6–1.1)
GFR AFRICAN AMERICAN: >60
GFR NON-AFRICAN AMERICAN: >60
GLUCOSE BLD-MCNC: 83 MG/DL (ref 70–99)
HCT VFR BLD CALC: 41.7 % (ref 36–48)
HEMOGLOBIN: 14 G/DL (ref 12–16)
MCH RBC QN AUTO: 30.2 PG (ref 26–34)
MCHC RBC AUTO-ENTMCNC: 33.7 G/DL (ref 31–36)
MCV RBC AUTO: 89.6 FL (ref 80–100)
PDW BLD-RTO: 13.9 % (ref 12.4–15.4)
PLATELET # BLD: 229 K/UL (ref 135–450)
PMV BLD AUTO: 10.3 FL (ref 5–10.5)
POTASSIUM SERPL-SCNC: 4.3 MMOL/L (ref 3.5–5.1)
RBC # BLD: 4.65 M/UL (ref 4–5.2)
SODIUM BLD-SCNC: 143 MMOL/L (ref 136–145)
TOTAL PROTEIN: 7.2 G/DL (ref 6.4–8.2)
WBC # BLD: 7.7 K/UL (ref 4–11)

## 2021-07-26 PROCEDURE — 99214 OFFICE O/P EST MOD 30 MIN: CPT | Performed by: DERMATOLOGY

## 2021-07-26 NOTE — PROGRESS NOTES
[facility]) Dermatology  Skinny Diaz M.D.  [address] [facility]  1970    45 y.o. female     Date of Visit: 2021    Chief Complaint:   Chief Complaint   Patient presents with    Lesion(s)     6 mo TBSE,         I was asked to see this patient by Dr. Esparza ref. provider found. History of Present Illness:  1. Total-body skin exam    Psoriasis-continues on Humira-has been on Humira since 2016. Small amount of residual psoriasis on her elbows. Does have clobetasol cream which she rarely uses-has a moisturizer when needed. Tolerating Humira well-denies side effects. No difficulty with infection. Did not have her Covid vaccine but is considering doing so. Multiple nevi. Stable in size, shape, color. Has not noticed any new or changing pigmented lesions. Skin history:     Psoriasis-Humira since 2016.  Residual psoriasis elbows     No personal history of skin cancer. + History of actinic keratoses-liquid nitrogen     Mother, daughter with a history of melanoma     Former tanning bed user. Lucila Joseph does practice sun avoidance and wear sunscreen    Review of Systems:  Constitutional: Reports general sense of well-being       Past Medical History, Surgical History, Family History, Medications and Allergies reviewed. Social History:   Social History     Socioeconomic History    Marital status:      Spouse name: Not on file    Number of children: Not on file    Years of education: Not on file    Highest education level: Not on file   Occupational History    Not on file   Tobacco Use    Smoking status: Former Smoker     Packs/day: 1.00     Years: 10.00     Pack years: 10.00     Types: Cigarettes     Start date: 1992     Quit date: 2002     Years since quittin.4    Smokeless tobacco: Never Used   Vaping Use    Vaping Use: Never used   Substance and Sexual Activity    Alcohol use:  Yes     Alcohol/week: 1.0 standard drinks     Types: 1 Glasses of wine per week     Comment: socially EVERY OTHER DAY     Drug use: No    Sexual activity: Yes     Partners: Male   Other Topics Concern    Not on file   Social History Narrative    06/06/2016    Lives with spouse and 4 kids. Has 14yo, 12yo. Has 2 grown children. Social Determinants of Health     Financial Resource Strain:     Difficulty of Paying Living Expenses:    Food Insecurity:     Worried About Running Out of Food in the Last Year:     [address] in the Last Year:    Transportation Needs:     Lack of Transportation (Medical):  Lack of Transportation (Non-Medical):    Physical Activity:     Days of Exercise per Week:     Minutes of Exercise per Session:    Stress:     Feeling of Stress :    Social Connections:     Frequency of Communication with Friends and Family:     Frequency of Social Gatherings with Friends and Family:     Attends Yazidism Services:     Active Member of Clubs or Organizations:     Attends Club or Organization Meetings:     Marital Status:    Intimate Partner Violence:     Fear of Current or Ex-Partner:     Emotionally Abused:     Physically Abused:     Sexually Abused:        Physical Examination       -General: Well-appearing, NAD  1. Normal affect. Total body skin exam including scalp, face-make-up, neck, chest, abdomen, back, bilateral upper extremities, bilateral lower extremities, ocular conjunctiva, external lips, and nails was performed. Examination normal unless stated below. Underwear area not examined. Scattered on the trunk and extremities are multiple well-defined round and oval symmetric smoothly-bordered uniformly brown macules and papules. Well-demarcated erythematous plaques both elbows      Assessment and Plan     1. Psoriasis-continue Humira. Moisturizer or clobetasol as needed for flares both elbows. 2. High risk medication use -due for labs. Did not have labs done after her last visit.   Labs placed-recommended patient have these done today. -Reviewed risks of injection-site reaction, flu-like symptoms, immunosuppression (serious infection, reactivation of latent TB), leukopenia, increased risk of MACE events, lymphoma/malignancy, parasthesia/MS changes, autoimmune disease.  -Baseline labs: CBC/diff, renal, LFTs  -Maintenance labs: LFTs q6mo. CBC/diff, renal annually.  -Annual PPD. Last PPD: 9/20 QuantiFERON gold     3.  Benign nevus - Benign acquired melanocytic nevi  -Recommend monthly self skin exams   -Educated regarding the ABCDEs of melanoma detection   -Call for any new/changing moles or concerning lesions  -Reviewed sun protective behavior -- sun avoidance during the peak hours of the day, sun-protective clothing (including hat and sunglasses), sunscreen use (water resistant, broad spectrum, SPF at least 30, need for reapplication every 2 to 3 hours), avoidance of tanning beds      Discussed vaccinations with patient-flu shot, Covid vaccine, update through primary care doctor as needed

## 2021-08-04 ENCOUNTER — OFFICE VISIT (OUTPATIENT)
Dept: FAMILY MEDICINE CLINIC | Age: 51
End: 2021-08-04
Payer: COMMERCIAL

## 2021-08-04 VITALS
WEIGHT: 150 LBS | BODY MASS INDEX: 27.44 KG/M2 | OXYGEN SATURATION: 99 % | HEART RATE: 54 BPM | DIASTOLIC BLOOD PRESSURE: 70 MMHG | SYSTOLIC BLOOD PRESSURE: 112 MMHG

## 2021-08-04 DIAGNOSIS — R00.2 PALPITATIONS: Primary | ICD-10-CM

## 2021-08-04 PROCEDURE — 93000 ELECTROCARDIOGRAM COMPLETE: CPT | Performed by: FAMILY MEDICINE

## 2021-08-04 PROCEDURE — 99213 OFFICE O/P EST LOW 20 MIN: CPT | Performed by: FAMILY MEDICINE

## 2021-08-04 SDOH — ECONOMIC STABILITY: FOOD INSECURITY: WITHIN THE PAST 12 MONTHS, THE FOOD YOU BOUGHT JUST DIDN'T LAST AND YOU DIDN'T HAVE MONEY TO GET MORE.: NEVER TRUE

## 2021-08-04 SDOH — ECONOMIC STABILITY: FOOD INSECURITY: WITHIN THE PAST 12 MONTHS, YOU WORRIED THAT YOUR FOOD WOULD RUN OUT BEFORE YOU GOT MONEY TO BUY MORE.: NEVER TRUE

## 2021-08-04 ASSESSMENT — SOCIAL DETERMINANTS OF HEALTH (SDOH): HOW HARD IS IT FOR YOU TO PAY FOR THE VERY BASICS LIKE FOOD, HOUSING, MEDICAL CARE, AND HEATING?: NOT HARD AT ALL

## 2021-08-04 NOTE — PROGRESS NOTES
Neelam Maldonado is a 45 y.o. female. HPI:  C/o racing heart at night when lying in bed for past few weeks. Feels heart races very fast for a few minutes, would resolve w slow breathing. Has woken up with these symptoms before in middle of night. Usually has low BP. Started exercising 2m ago, no symptoms while exercising. Has felt more tired than usual during the day, lack of energy. Hard time falling asleep as well. No hx anxiety. Has 2 cups of coffee per morning. No tobacco or drug use. Tries to drink 3-4 bottles of water x 12 oz. ROS:  Gen:  Denies fever, chills, headaches. HEENT:  Denies cold symptoms, sore throat. CV:  Denies chest pain or tightness. Pulm:  Denies shortness of breath, cough. Abd:  Denies abdominal pain, change in bowel habits. I have reviewed the patient's medical/surgical/family/social in detail and updated the computerized patient record as appropriate. Current Outpatient Medications   Medication Sig Dispense Refill    Adalimumab (HUMIRA PEN) 40 MG/0.4ML PNKT INJECT 40MG (1 PEN) UNDER THE SKIN EVERY 14 DAYS 2 each 6    ibuprofen (ADVIL;MOTRIN) 200 MG tablet Take 200 mg by mouth every 6 hours as needed for Pain      rosuvastatin (CRESTOR) 5 MG tablet Take 1 tablet by mouth daily 90 tablet 3    tretinoin (RETIN-A) 0.025 % cream Apply a pea sized amount to the face QHS 20 g 4    clobetasol (TEMOVATE) 0.05 % cream Apply to affected area BID PRN flares 60 g 2     No current facility-administered medications for this visit.        Past Medical History:   Diagnosis Date    Arthritis     THUMBS BILATERALLY    History of blood transfusion     Hyperlipidemia     Psoriasis      Past Surgical History:   Procedure Laterality Date    BREAST SURGERY Left 11/20/2020    LEFT BREAST EXCISIONAL BIOPSY performed by Mariangel Coello MD at [place] [address]  03/01/2005     BREAST NEEDLE BIOPSY LEFT  10/19/2020     BREAST NEEDLE BIOPSY LEFT 10/19/2020 [facility] ULTRASOUND     Family History   Problem Relation Age of Onset    Cancer Mother         skin cancer     Social History     Socioeconomic History    Marital status:      Spouse name: Not on file    Number of children: Not on file    Years of education: Not on file    Highest education level: Not on file   Occupational History    Not on file   Tobacco Use    Smoking status: Former Smoker     Packs/day: 1.00     Years: 10.00     Pack years: 10.00     Types: Cigarettes     Start date: 1992     Quit date: 2002     Years since quittin.5    Smokeless tobacco: Never Used   Vaping Use    Vaping Use: Never used   Substance and Sexual Activity    Alcohol use: Yes     Alcohol/week: 1.0 standard drinks     Types: 1 Glasses of wine per week     Comment: socially EVERY OTHER DAY     Drug use: No    Sexual activity: Yes     Partners: Male   Other Topics Concern    Not on file   Social History Narrative    2016    Lives with spouse and 4 kids. Has 12yo, 10yo. Has 2 grown children. Social Determinants of Health     Financial Resource Strain: Low Risk     Difficulty of Paying Living Expenses: Not hard at all   Food Insecurity: No Food Insecurity    Worried About Running Out of Food in the Last Year: Never true    Mac of Food in the Last Year: Never true   Transportation Needs:     Lack of Transportation (Medical):      Lack of Transportation (Non-Medical):    Physical Activity:     Days of Exercise per Week:     Minutes of Exercise per Session:    Stress:     Feeling of Stress :    Social Connections:     Frequency of Communication with Friends and Family:     Frequency of Social Gatherings with Friends and Family:     Attends Oriental orthodox Services:     Active Member of Clubs or Organizations:     Attends Club or Organization Meetings:     Marital Status:    Intimate Partner Violence:     Fear of Current or Ex-Partner:     Emotionally Abused:     Physically Abused:     Sexually Abused:          OBJECTIVE:  /70   Pulse 54   Wt 150 lb (68 kg)   SpO2 99%   BMI 27.44 kg/m²   GEN:  WDWN, NAD  HEENT:  NCAT,  PERRL, EOMI. NECK:  Supple without adenopathy. CV:  Regular rate and rhythm, S1 and S2 normal, no murmurs, clicks, gallops or rubs. No edema. PULM:  Chest is clear, no wheezing or rales. Normal symmetric air entry throughout both lung fields. PSYCH: normal mood and affect. Intact judgement and insight  NEURO: A&O x 3    EKG: sinus bradycardia. ASSESSMENT/PLAN:  1. Palpitations  Will get TSH and holter  - TSH with Reflex;  Future  - EKG 12 lead  - Holter Monitor 24 Hour; Future

## 2021-09-20 LAB
CHOLESTEROL, TOTAL: 319 MG/DL (ref 0–199)
GLUCOSE BLD-MCNC: 88 MG/DL (ref 70–99)
HDLC SERPL-MCNC: 62 MG/DL (ref 40–60)
LDL CHOLESTEROL CALCULATED: 216 MG/DL
TRIGL SERPL-MCNC: 207 MG/DL (ref 0–150)

## 2021-10-17 DIAGNOSIS — M25.559 HIP PAIN: Primary | ICD-10-CM

## 2021-10-19 ENCOUNTER — OFFICE VISIT (OUTPATIENT)
Dept: ORTHOPEDIC SURGERY | Age: 51
End: 2021-10-19
Payer: COMMERCIAL

## 2021-10-19 VITALS — HEIGHT: 62 IN | BODY MASS INDEX: 28.34 KG/M2 | RESPIRATION RATE: 16 BRPM | WEIGHT: 154 LBS

## 2021-10-19 DIAGNOSIS — M25.559 HIP PAIN: ICD-10-CM

## 2021-10-19 DIAGNOSIS — M70.61 TROCHANTERIC BURSITIS OF RIGHT HIP: Primary | ICD-10-CM

## 2021-10-19 PROCEDURE — 99243 OFF/OP CNSLTJ NEW/EST LOW 30: CPT | Performed by: ORTHOPAEDIC SURGERY

## 2021-10-19 PROCEDURE — 20610 DRAIN/INJ JOINT/BURSA W/O US: CPT | Performed by: ORTHOPAEDIC SURGERY

## 2021-10-19 RX ORDER — TRIAMCINOLONE ACETONIDE 40 MG/ML
40 INJECTION, SUSPENSION INTRA-ARTICULAR; INTRAMUSCULAR ONCE
Status: COMPLETED | OUTPATIENT
Start: 2021-10-19 | End: 2021-10-19

## 2021-10-19 RX ORDER — LIDOCAINE HYDROCHLORIDE 10 MG/ML
2 INJECTION, SOLUTION INFILTRATION; PERINEURAL ONCE
Status: COMPLETED | OUTPATIENT
Start: 2021-10-19 | End: 2021-10-19

## 2021-10-19 RX ORDER — BUPIVACAINE HYDROCHLORIDE 2.5 MG/ML
2 INJECTION, SOLUTION INFILTRATION; PERINEURAL ONCE
Status: COMPLETED | OUTPATIENT
Start: 2021-10-19 | End: 2021-10-19

## 2021-10-19 RX ADMIN — TRIAMCINOLONE ACETONIDE 40 MG: 40 INJECTION, SUSPENSION INTRA-ARTICULAR; INTRAMUSCULAR at 16:16

## 2021-10-19 RX ADMIN — LIDOCAINE HYDROCHLORIDE 2 ML: 10 INJECTION, SOLUTION INFILTRATION; PERINEURAL at 16:16

## 2021-10-19 RX ADMIN — BUPIVACAINE HYDROCHLORIDE 5 MG: 2.5 INJECTION, SOLUTION INFILTRATION; PERINEURAL at 16:16

## 2021-10-19 NOTE — PROGRESS NOTES
CHIEF COMPLAINT: Right hip pain. History:   Alonzo Manuel is a 45 y.o. female who presents today for evaluation and treatment of right hip pain / injury. Patient was referred Ching Rosario MD for Sports Medicine consultation. This is evaluated as a personal injury. She states that the pain began 8  months ago. Patient rates the pain as a 4/10. Patient did not have a history of injury. Pain is located along the lateral aspect of her hip at the greater trochanter. She states radiates down the side of her hip to her knee. She denies any numbness or tingling in her foot. Patient does not have a history of back pain. Patient has not had PT. The patient has taken NSAIDs. The patient has not had an intra-articular hip injection. The patient's occupation is at [employer]. Outside reports reviewed: none.       Past Medical History:   Diagnosis Date    Arthritis     THUMBS BILATERALLY    History of blood transfusion     Hyperlipidemia     Psoriasis        Past Surgical History:   Procedure Laterality Date    BREAST SURGERY Left 2020    LEFT BREAST EXCISIONAL BIOPSY performed by Bonnie Pizarro MD at [address] [place] [address]  2005     BREAST NEEDLE BIOPSY LEFT  10/19/2020     BREAST NEEDLE BIOPSY LEFT 10/19/2020 [address] [facility] ULTRASOUND       Family History   Problem Relation Age of Onset    Cancer Mother         skin cancer       Social History     Socioeconomic History    Marital status:      Spouse name: None    Number of children: None    Years of education: None    Highest education level: None   Occupational History    Occupation: Director     Employer: [employer]   Tobacco Use    Smoking status: Former Smoker     Packs/day: 1.00     Years: 10.00     Pack years: 10.00     Types: Cigarettes     Start date: 1992     Quit date: 2002     Years since quittin.7    Smokeless tobacco: Never Used   Vaping Use    Vaping Use: Never used Substance and Sexual Activity    Alcohol use: Yes     Alcohol/week: 1.0 standard drinks     Types: 1 Glasses of wine per week     Comment: socially EVERY OTHER DAY     Drug use: No    Sexual activity: Yes     Partners: Male   Other Topics Concern    None   Social History Narrative    06/06/2016    Lives with spouse and 4 kids. Has 12yo, 10yo. Has 2 grown children. Social Determinants of Health     Financial Resource Strain: Low Risk     Difficulty of Paying Living Expenses: Not hard at all   Food Insecurity: No Food Insecurity    Worried About Running Out of Food in the Last Year: Never true    Mac of Food in the Last Year: Never true   Transportation Needs:     Lack of Transportation (Medical):  Lack of Transportation (Non-Medical):    Physical Activity:     Days of Exercise per Week:     Minutes of Exercise per Session:    Stress:     Feeling of Stress :    Social Connections:     Frequency of Communication with Friends and Family:     Frequency of Social Gatherings with Friends and Family:     Attends Orthodoxy Services:     Active Member of Clubs or Organizations:     Attends Club or Organization Meetings:     Marital Status:    Intimate Partner Violence:     Fear of Current or Ex-Partner:     Emotionally Abused:     Physically Abused:     Sexually Abused:        Current Outpatient Medications   Medication Sig Dispense Refill    Adalimumab (HUMIRA PEN) 40 MG/0.4ML PNKT INJECT 40MG (1 PEN) UNDER THE SKIN EVERY 14 DAYS 2 each 6    ibuprofen (ADVIL;MOTRIN) 200 MG tablet Take 200 mg by mouth every 6 hours as needed for Pain      rosuvastatin (CRESTOR) 5 MG tablet Take 1 tablet by mouth daily 90 tablet 3    tretinoin (RETIN-A) 0.025 % cream Apply a pea sized amount to the face QHS 20 g 4    clobetasol (TEMOVATE) 0.05 % cream Apply to affected area BID PRN flares 60 g 2     No current facility-administered medications for this visit.        Allergies   Allergen Reactions    Penicillins Anaphylaxis        Review of Systems:  I have reviewed the clinically relevant past medical history, medications, allergies, family history, social history, and 13 point Review of Systems from the patient's recent history form & documented any details relevant to today's presenting complaints in the history above. The patient's self-reported past medical history, medications, allergies, family history, social history, and Review of Systems form from 10/19/21 have been scanned into the chart under the \"Media\" tab. Physical Examination:     Vital signs:   Resp 16   Ht 5' 2\" (1.575 m)   Wt 154 lb (69.9 kg)   BMI 28.17 kg/m²     General:  alert, appears stated age, cooperative and no distress   Feet:  normal   Gait:  Normal. The patient can bear weight on the injured extremity. Right Hip  Trendelenburg test:  negative on Right   negative on Left      Passive ROM:  0 degrees extension    100 degrees flexion   45 degrees external rotation   25 degrees internal rotation   Bilateral hips   CAIO:  2 1/2   Left: 3   Impingement test:  negative   Left hip: negative   Labral stress test:  negative   Left hip: negative   Josh test:  positive bilateral    Greater trochanter tenderness:  positive   Pirifiormis / Gluteus medius tenderness:  negative   Iliopsoas strength:  5 / 5    Bilateral hips   Logroll:  negative   Straight-leg raise:  negative   Leg length discrepancy:  Equal      Motor exam noted and recorded, quadriceps, hamstrings, foot dorsiflexors and plantar flexors are intact 5/5 equal and symmetric. Sensation is intact grossly to light touch in the tibial, peroneal, sural, and saphenous nerve distributions bilaterally. Both feet are well perfused and sensory is intact to feet equal and symmetric. There is not  any cellulitis, skin lesions, or lymphedema noted bilaterally. Imaging:  Right hip xrays:   AP pelvis xray and AP and lateral views obtained and reviewed.   They demonstrate no evidence of acute fracture, subluxation, or dislocation. Maintained joint spaces bilaterally. Assessment:     Right hip Trochanteric bursitis         Plan:     Natural history and expected course discussed. Questions answered. PT referral provided. The risks and benefits of an injection were discussed with the patient. The patient had full opportunity to ask questions and all were answered. The patient then provided verbal informed consent. The skin was then prepped with betadine solution and alcohol. Under aseptic conditions, the  right trochanteric bursa was injected with 2cc of 1% xylocaine, 2cc of 0.25% marcaine, and 1cc of Kenalog (40mg/ml). There were no immediate complications following the injection. The patient was advised of the possibility of injection site reaction and instructed to apply ice to the area and take NSAIDs if able. NSAIDs as needed. Ice as needed. Off in 3 months          Jayshree Mae. Vidhi Castrejon MD  Orthopaedic Surgery and Sports Medicine     Disclaimer: This note was generated with use of a verbal recognition program and an attempt was made to check for errors. It is possible that there are still dictated errors within this office note. If so, please bring any significant errors to my attention for an addendum. All efforts were made to ensure that this office note is accurate.

## 2021-10-26 ENCOUNTER — HOSPITAL ENCOUNTER (OUTPATIENT)
Dept: PHYSICAL THERAPY | Age: 51
Setting detail: THERAPIES SERIES
Discharge: HOME | End: 2021-10-26
Payer: COMMERCIAL

## 2021-10-26 NOTE — PROGRESS NOTES
[facility]    Physical Therapy  Cancellation/No-show Note  Patient Name:  Jennifer Millan  :  1970   Date:  10/26/2021    Cancelled visits to date: 1  No-shows to date: 0    For today's appointment patient:  [x]  Cancelled  10/26  []  Rescheduled appointment  []  No-show     Reason given by patient:  []  Patient ill  []  Conflicting appointment  []  No transportation    []  Conflict with work  []  No reason given  [x]  Other:     Comments:  Pt chose not to stay after she realized the cost. Her deductible had not been met. Pt was offered financial aide but did not want it. Phone call information:   []  Phone call made today to patient at _ time at number provided:      []  Patient answered, conversation as follows:    []  Patient did not answer, message left as follows:  []  Phone call not made today  []  Phone call not needed - pt contacted us to cancel and provided reason for cancellation.      Electronically signed by:  Caryn Morse PT, MPT

## 2022-01-04 RX ORDER — ADALIMUMAB 40MG/0.4ML
KIT SUBCUTANEOUS
Qty: 2 EACH | Refills: 6 | Status: SHIPPED | OUTPATIENT
Start: 2022-01-04 | End: 2022-08-03 | Stop reason: SDUPTHER

## 2022-01-26 ENCOUNTER — OFFICE VISIT (OUTPATIENT)
Dept: DERMATOLOGY | Age: 52
End: 2022-01-26
Payer: COMMERCIAL

## 2022-01-26 VITALS — TEMPERATURE: 97.5 F

## 2022-01-26 DIAGNOSIS — Z79.899 HIGH RISK MEDICATION USE: ICD-10-CM

## 2022-01-26 DIAGNOSIS — D22.9 BENIGN NEVUS: ICD-10-CM

## 2022-01-26 DIAGNOSIS — L40.9 PSORIASIS: Primary | ICD-10-CM

## 2022-01-26 LAB
ALBUMIN SERPL-MCNC: 4.6 G/DL (ref 3.4–5)
ALP BLD-CCNC: 68 U/L (ref 40–129)
ALT SERPL-CCNC: 16 U/L (ref 10–40)
AST SERPL-CCNC: 15 U/L (ref 15–37)
BILIRUB SERPL-MCNC: 0.3 MG/DL (ref 0–1)
BILIRUBIN DIRECT: <0.2 MG/DL (ref 0–0.3)
BILIRUBIN, INDIRECT: NORMAL MG/DL (ref 0–1)
HCT VFR BLD CALC: 42.8 % (ref 36–48)
HEMOGLOBIN: 14.3 G/DL (ref 12–16)
MCH RBC QN AUTO: 29.6 PG (ref 26–34)
MCHC RBC AUTO-ENTMCNC: 33.3 G/DL (ref 31–36)
MCV RBC AUTO: 88.7 FL (ref 80–100)
PDW BLD-RTO: 13.7 % (ref 12.4–15.4)
PLATELET # BLD: 247 K/UL (ref 135–450)
PMV BLD AUTO: 9.2 FL (ref 5–10.5)
RBC # BLD: 4.83 M/UL (ref 4–5.2)
TOTAL PROTEIN: 7.1 G/DL (ref 6.4–8.2)
WBC # BLD: 6.7 K/UL (ref 4–11)

## 2022-01-26 PROCEDURE — 99214 OFFICE O/P EST MOD 30 MIN: CPT | Performed by: DERMATOLOGY

## 2022-01-26 NOTE — Clinical Note
I recommended this patient contact you-I just wanted ensure that all of her vaccines are up-to-date since she is on Humira. I was not certain if she had had Pneumovax yet.  Gerri Ascencio

## 2022-01-26 NOTE — PROGRESS NOTES
[facility]) Dermatology  Anitra Nieto M.D.  [address] [place] [address]  1970    45 y.o. female     Date of Visit: 2022    Chief Complaint: No chief complaint on file. I was asked to see this patient by Dr. Esparza ref. provider found. History of Present Illness:  1. Total-body skin exam    Psoriasis-patient has been on Humira since 2016. Continues to have residual psoriasis on her elbows. Asymptomatic, not treated. Patient uses only a moisturizer. Multiple nevi. Stable in size, shape, color. Has not noticed any new or changing pigmented lesions. Has had one shingles vaccine-due for the second. Does not think that she has had Pneumovax. Has had Covid vaccines but not booster. Did have her flu vaccine. Skin history:     Psoriasis-Humira since 2016.  Residual psoriasis elbows     No personal history of skin cancer. + History of actinic keratoses-liquid nitrogen     Mother, daughter with a history of melanoma     Former tanning bed user.  Now does practice sun avoidance and wear sunscreen    Review of Systems:  Constitutional: Reports general sense of well-being       Past Medical History, Surgical History, Family History, Medications and Allergies reviewed. Social History:   Social History     Socioeconomic History    Marital status:      Spouse name: Not on file    Number of children: Not on file    Years of education: Not on file    Highest education level: Not on file   Occupational History    Occupation: Director     Employer: [employer]   Tobacco Use    Smoking status: Former Smoker     Packs/day: 1.00     Years: 10.00     Pack years: 10.00     Types: Cigarettes     Start date: 1992     Quit date: 2002     Years since quittin.9    Smokeless tobacco: Never Used   Vaping Use    Vaping Use: Never used   Substance and Sexual Activity    Alcohol use:  Yes     Alcohol/week: 1.0 standard drink     Types: 1 Glasses of wine per week     Comment: socially EVERY OTHER DAY     Drug use: No    Sexual activity: Yes     Partners: Male   Other Topics Concern    Not on file   Social History Narrative    06/06/2016    Lives with spouse and 4 kids. Has 14yo, 12yo. Has 2 grown children. Social Determinants of Health     Financial Resource Strain: Low Risk     Difficulty of Paying Living Expenses: Not hard at all   Food Insecurity: No Food Insecurity    Worried About Running Out of Food in the Last Year: Never true    Mac of Food in the Last Year: Never true   Transportation Needs:     Lack of Transportation (Medical): Not on file    Lack of Transportation (Non-Medical): Not on file   Physical Activity:     Days of Exercise per Week: Not on file    Minutes of Exercise per Session: Not on file   Stress:     Feeling of Stress : Not on file   Social Connections:     Frequency of Communication with Friends and Family: Not on file    Frequency of Social Gatherings with Friends and Family: Not on file    Attends Muslim Services: Not on file    Active Member of [address] [place] [address] or Organizations: Not on file    Attends Club or Organization Meetings: Not on file    Marital Status: Not on file   Intimate Partner Violence:     Fear of Current or Ex-Partner: Not on file    Emotionally Abused: Not on file    Physically Abused: Not on file    Sexually Abused: Not on file   Housing Stability:     Unable to Pay for Housing in the Last Year: Not on file    Number of [place] in the Last Year: Not on file    Unstable Housing in the Last Year: Not on file       Physical Examination       -General: Well-appearing, NAD  1. Normal affect. Total body skin exam including scalp, face, neck, chest, abdomen, back, bilateral upper extremities, bilateral lower extremities, ocular conjunctiva, external lips, and nails was performed. Examination normal unless stated below. Underwear area not examined.   Scattered on the trunk and extremities are multiple well-defined round and oval symmetric smoothly-bordered uniformly brown macules and papules. Erythematous well-demarcated plaques both elbows  Labs ordered-CBC, AST, ALT, alk phos, total protein      Assessment and Plan     1. Psoriasis-well-controlled on Humira, continue moisturizer. Patient will contact primary care doctor to ensure vaccines are up-to-date-communicated with primary care doctor regarding vaccines. 2. High risk medication use - -Reviewed risks of injection-site reaction, flu-like symptoms, immunosuppression (serious infection, reactivation of latent TB), leukopenia, increased risk of MACE events, lymphoma/malignancy, parasthesia/MS changes, autoimmune disease.  -Baseline labs: CBC/diff, renal, LFTs  -Maintenance labs: LFTs q6mo. CBC/diff, renal annually.  -Annual PPD. Last PPD: 7/21 QuantiFERON gold     3.   Benign nevi-Benign acquired melanocytic nevi  -Recommend monthly self skin exams   -Educated regarding the ABCDEs of melanoma detection   -Call for any new/changing moles or concerning lesions  -Reviewed sun protective behavior -- sun avoidance during the peak hours of the day, sun-protective clothing (including hat and sunglasses), sunscreen use (water resistant, broad spectrum, SPF at least 30, need for reapplication every 2 to 3 hours), avoidance of tanning beds

## 2022-02-01 NOTE — PROGRESS NOTES
Patient due for physical. Please call to have her schedule and we can go over any needed vaccines as recommended by her dermatologist given the medicaiton she is on.

## 2022-02-14 ENCOUNTER — TELEPHONE (OUTPATIENT)
Dept: DERMATOLOGY | Age: 52
End: 2022-02-14

## 2022-02-14 NOTE — TELEPHONE ENCOUNTER
Patient's insurance company offers the patient's a case manger. If needed the telephone # [phone] ext.  [phone]

## 2022-03-14 ENCOUNTER — OFFICE VISIT (OUTPATIENT)
Dept: FAMILY MEDICINE CLINIC | Age: 52
End: 2022-03-14
Payer: COMMERCIAL

## 2022-03-14 VITALS
SYSTOLIC BLOOD PRESSURE: 116 MMHG | DIASTOLIC BLOOD PRESSURE: 70 MMHG | TEMPERATURE: 97.4 F | OXYGEN SATURATION: 99 % | BODY MASS INDEX: 28.01 KG/M2 | WEIGHT: 152.2 LBS | HEART RATE: 58 BPM | HEIGHT: 62 IN

## 2022-03-14 DIAGNOSIS — E66.3 OVERWEIGHT: ICD-10-CM

## 2022-03-14 DIAGNOSIS — Z00.00 ENCOUNTER FOR WELL ADULT EXAM WITHOUT ABNORMAL FINDINGS: Primary | ICD-10-CM

## 2022-03-14 DIAGNOSIS — E78.5 HYPERLIPIDEMIA, UNSPECIFIED HYPERLIPIDEMIA TYPE: ICD-10-CM

## 2022-03-14 DIAGNOSIS — R00.2 PALPITATIONS: ICD-10-CM

## 2022-03-14 DIAGNOSIS — L40.9 PSORIASIS: ICD-10-CM

## 2022-03-14 PROCEDURE — 90471 IMMUNIZATION ADMIN: CPT | Performed by: FAMILY MEDICINE

## 2022-03-14 PROCEDURE — 99396 PREV VISIT EST AGE 40-64: CPT | Performed by: FAMILY MEDICINE

## 2022-03-14 PROCEDURE — 90750 HZV VACC RECOMBINANT IM: CPT | Performed by: FAMILY MEDICINE

## 2022-03-14 PROCEDURE — 93000 ELECTROCARDIOGRAM COMPLETE: CPT | Performed by: FAMILY MEDICINE

## 2022-03-14 RX ORDER — BIOTIN 10000 MCG
CAPSULE ORAL
COMMUNITY

## 2022-03-14 RX ORDER — M-VIT,TX,IRON,MINS/CALC/FOLIC 27MG-0.4MG
1 TABLET ORAL DAILY
COMMUNITY

## 2022-03-14 RX ORDER — ROSUVASTATIN CALCIUM 20 MG/1
20 TABLET, COATED ORAL DAILY
Qty: 90 TABLET | Refills: 3 | Status: SHIPPED | OUTPATIENT
Start: 2022-03-14

## 2022-03-14 SDOH — ECONOMIC STABILITY: FOOD INSECURITY: WITHIN THE PAST 12 MONTHS, THE FOOD YOU BOUGHT JUST DIDN'T LAST AND YOU DIDN'T HAVE MONEY TO GET MORE.: NEVER TRUE

## 2022-03-14 SDOH — HEALTH STABILITY: MENTAL HEALTH: HOW OFTEN DO YOU HAVE A DRINK CONTAINING ALCOHOL?: 4 OR MORE TIMES A WEEK

## 2022-03-14 SDOH — SOCIAL STABILITY: SOCIAL NETWORK: HOW OFTEN DO YOU ATTEND CHURCH OR RELIGIOUS SERVICES?: NEVER

## 2022-03-14 SDOH — SOCIAL STABILITY: SOCIAL NETWORK: HOW OFTEN DO YOU ATTENT MEETINGS OF THE CLUB OR ORGANIZATION YOU BELONG TO?: NEVER

## 2022-03-14 SDOH — ECONOMIC STABILITY: INCOME INSECURITY: IN THE LAST 12 MONTHS, WAS THERE A TIME WHEN YOU WERE NOT ABLE TO PAY THE MORTGAGE OR RENT ON TIME?: NO

## 2022-03-14 SDOH — ECONOMIC STABILITY: TRANSPORTATION INSECURITY
IN THE PAST 12 MONTHS, HAS LACK OF TRANSPORTATION KEPT YOU FROM MEETINGS, WORK, OR FROM GETTING THINGS NEEDED FOR DAILY LIVING?: NO

## 2022-03-14 SDOH — SOCIAL STABILITY: SOCIAL NETWORK: ARE YOU MARRIED, WIDOWED, DIVORCED, SEPARATED, NEVER MARRIED, OR LIVING WITH A PARTNER?: DIVORCED

## 2022-03-14 SDOH — ECONOMIC STABILITY: HOUSING INSECURITY
IN THE LAST 12 MONTHS, WAS THERE A TIME WHEN YOU DID NOT HAVE A STEADY PLACE TO SLEEP OR SLEPT IN A SHELTER (INCLUDING NOW)?: NO

## 2022-03-14 SDOH — SOCIAL STABILITY: SOCIAL NETWORK: IN A TYPICAL WEEK, HOW MANY TIMES DO YOU TALK ON THE PHONE WITH FAMILY, FRIENDS, OR NEIGHBORS?: THREE TIMES A WEEK

## 2022-03-14 SDOH — ECONOMIC STABILITY: TRANSPORTATION INSECURITY
IN THE PAST 12 MONTHS, HAS THE LACK OF TRANSPORTATION KEPT YOU FROM MEDICAL APPOINTMENTS OR FROM GETTING MEDICATIONS?: NO

## 2022-03-14 SDOH — HEALTH STABILITY: MENTAL HEALTH
STRESS IS WHEN SOMEONE FEELS TENSE, NERVOUS, ANXIOUS, OR CAN'T SLEEP AT NIGHT BECAUSE THEIR MIND IS TROUBLED. HOW STRESSED ARE YOU?: NOT AT ALL

## 2022-03-14 SDOH — ECONOMIC STABILITY: INCOME INSECURITY: HOW HARD IS IT FOR YOU TO PAY FOR THE VERY BASICS LIKE FOOD, HOUSING, MEDICAL CARE, AND HEATING?: NOT HARD AT ALL

## 2022-03-14 SDOH — SOCIAL STABILITY: SOCIAL NETWORK: HOW OFTEN DO YOU GET TOGETHER WITH FRIENDS OR RELATIVES?: ONCE A WEEK

## 2022-03-14 SDOH — SOCIAL STABILITY: SOCIAL NETWORK
DO YOU BELONG TO ANY CLUBS OR ORGANIZATIONS SUCH AS CHURCH GROUPS UNIONS, FRATERNAL OR ATHLETIC GROUPS, OR SCHOOL GROUPS?: NO

## 2022-03-14 SDOH — HEALTH STABILITY: MENTAL HEALTH: HOW MANY STANDARD DRINKS CONTAINING ALCOHOL DO YOU HAVE ON A TYPICAL DAY?: 1 OR 2

## 2022-03-14 SDOH — ECONOMIC STABILITY: FOOD INSECURITY: WITHIN THE PAST 12 MONTHS, YOU WORRIED THAT YOUR FOOD WOULD RUN OUT BEFORE YOU GOT MONEY TO BUY MORE.: NEVER TRUE

## 2022-03-14 SDOH — HEALTH STABILITY: PHYSICAL HEALTH: ON AVERAGE, HOW MANY MINUTES DO YOU ENGAGE IN EXERCISE AT THIS LEVEL?: 60 MIN

## 2022-03-14 SDOH — ECONOMIC STABILITY: HOUSING INSECURITY: IN THE LAST 12 MONTHS, HOW MANY PLACES HAVE YOU LIVED?: 1

## 2022-03-14 SDOH — HEALTH STABILITY: PHYSICAL HEALTH: ON AVERAGE, HOW MANY DAYS PER WEEK DO YOU ENGAGE IN MODERATE TO STRENUOUS EXERCISE (LIKE A BRISK WALK)?: 5 DAYS

## 2022-03-14 NOTE — PROGRESS NOTES
History and Physical      Chief Complaint:   Cathy Prieto is a 45 y.o. female who presents for complete physical examination. Chief Complaint   Patient presents with    Annual Exam       HPI:     \" Anastasia\"     Palpitations: Mentions at the end of her visit that at night she sometimes feels her heart beating fast. No chest pain. Lasts a minute then passes. Got up to 115bpm but not sure how fast usually. No other symptoms. Has 1-2 cups of coffee a day. Overweight:  No matter what she does she can't loose weight. No binge eating. Goes to the gym and does the treadmill and weights. She would like medication.      Psoriasis: doing well on humira. Seeing dermatology.      HLD:  Taking crestor the last two weeks. Stopped the lipitor as it made her nauseated.       L breast mass: s/p excisional biopsy 11/2020- benign.         SH: 10/2020: . Cresencio Sanz with son Mikal [facility] (patient here) and daughter Lisa Alvarez. In a relationship with someone else and sexually active. Page elliott.  06/06/2016  Lives with spouse and 4 kids.  Has 11yo, 8yo.  Has 2 grown children.           Vitals:    03/14/22 1435   BP: 116/70   Site: Right Upper Arm   Position: Sitting   Cuff Size: Small Adult   Pulse: 58   Temp: 97.4 °F (36.3 °C)   TempSrc: Oral   SpO2: 99%   Weight: 152 lb 3.2 oz (69 kg)   Height: 5' 2\" (1.575 m)       Wt Readings from Last 3 Encounters:   03/14/22 152 lb 3.2 oz (69 kg)   10/19/21 154 lb (69.9 kg)   08/04/21 150 lb (68 kg)     BP Readings from Last 3 Encounters:   03/14/22 116/70   08/04/21 112/70   11/20/20 123/60       Patient Active Problem List   Diagnosis    Psoriasis    Hyperlipidemia    Left breast mass       Preventive Care:  Health Maintenance   Topic Date Due    COVID-19 Vaccine (1) Never done    Colorectal Cancer Screen  Never done    Shingles Vaccine (2 of 2) 12/11/2020    Flu vaccine (1) 09/01/2021    Depression Screen  03/23/2022    Lipid screen  09/20/2022    Breast cancer screen 10/19/2022    Cervical cancer screen  10/16/2025    DTaP/Tdap/Td vaccine (2 - Td or Tdap) 06/06/2026    Hepatitis C screen  Completed    HIV screen  Completed    Hepatitis A vaccine  Aged Out    Hepatitis B vaccine  Aged Out    Hib vaccine  Aged Out    Meningococcal (ACWY) vaccine  Aged Out    Pneumococcal 0-64 years Vaccine  Aged [employer] History   Administered Date(s) Administered    Influenza Virus Vaccine 10/25/2018, 10/29/2019    Influenza, Quadv, IM, PF (6 mo and older Fluzone, Flulaval, Fluarix, and 3 yrs and older Afluria) 10/16/2020    Tdap (Boostrix, Adacel) 06/06/2016    Zoster Recombinant (Shingrix) 10/16/2020       Allergies   Allergen Reactions    Penicillins Anaphylaxis     Outpatient Medications Marked as Taking for the 3/14/22 encounter (Office Visit) with David Valadez MD   Medication Sig Dispense Refill    Biotin 10 MG CAPS Take by mouth      Multiple Vitamins-Minerals (THERAPEUTIC MULTIVITAMIN-MINERALS) tablet Take 1 tablet by mouth daily      rosuvastatin (CRESTOR) 20 MG tablet Take 1 tablet by mouth daily 90 tablet 3    naltrexone-buPROPion (CONTRAVE) 8-90 MG per extended release tablet Take 2 tablets by mouth 2 times daily Start 1 tab daily for 1 week, then take 1 tab BID for week 2, then take 2 tab in the AM and 1 tab in the PM for week 3, then take 2 tab po BID.  70 tablet 0    Adalimumab (HUMIRA PEN) 40 MG/0.4ML PNKT INJECT 40MG UNDER THE SKIN EVERY 14 DAYS 2 each 6    ibuprofen (ADVIL;MOTRIN) 200 MG tablet Take 200 mg by mouth every 6 hours as needed for Pain      tretinoin (RETIN-A) 0.025 % cream Apply a pea sized amount to the face QHS 20 g 4    clobetasol (TEMOVATE) 0.05 % cream Apply to affected area BID PRN flares 60 g 2       Past Medical History:   Diagnosis Date    Arthritis     THUMBS BILATERALLY    History of blood transfusion     Hyperlipidemia     Psoriasis      Past Surgical History:   Procedure Laterality Date    BREAST SURGERY Left 2020    LEFT BREAST EXCISIONAL BIOPSY performed by Jacoby Travis MD at [address]   2005    US BREAST NEEDLE BIOPSY LEFT  10/19/2020    US BREAST NEEDLE BIOPSY LEFT 10/19/2020 [facility]TR[facility]     Family History   Problem Relation Age of Onset    Cancer Mother         skin cancer     Social History     Socioeconomic History    Marital status:      Spouse name: Not on file    Number of children: Not on file    Years of education: Not on file    Highest education level: Not on file   Occupational History    Occupation: Director     Employer: [employer]   Tobacco Use    Smoking status: Former Smoker     Packs/day: 1.00     Years: 10.00     Pack years: 10.00     Types: Cigarettes     Start date: 1992     Quit date: 2002     Years since quittin.1    Smokeless tobacco: Never Used   Vaping Use    Vaping Use: Never used   Substance and Sexual Activity    Alcohol use: Yes     Alcohol/week: 1.0 standard drink     Types: 1 Glasses of wine per week     Comment: socially EVERY OTHER DAY     Drug use: No    Sexual activity: Yes     Partners: Male   Other Topics Concern    Not on file   Social History Narrative    2016    Lives with spouse and 4 kids. Has 14yo, 12yo. Has 2 grown children. Social Determinants of Health     Financial Resource Strain: Low Risk     Difficulty of Paying Living Expenses: Not hard at all   Food Insecurity: No Food Insecurity    Worried About Running Out of Food in the Last Year: Never true    Mac of Food in the Last Year: Never true   Transportation Needs: No Transportation Needs    Lack of Transportation (Medical): No    Lack of Transportation (Non-Medical):  No   Physical Activity: Sufficiently Active    Days of Exercise per Week: 5 days    Minutes of Exercise per Session: 60 min   Stress: No Stress Concern Present    Feeling of Stress : Not at all   Social Connections: Socially Isolated    Frequency of Communication with Friends and Family: Three times a week    Frequency of Social Gatherings with Friends and Family: Once a week    Attends Scientologist Services: Never    Active Member of Clubs or Organizations: No    Attends Club or Organization Meetings: Never    Marital Status:    Intimate Partner Violence:     Fear of Current or Ex-Partner: Not on file    Emotionally Abused: Not on file    Physically Abused: Not on file    Sexually Abused: Not on file   Housing Stability: [address] Unable to Pay for Housing in the Last Year: No    Number of [place] in the Last Year: 1    Unstable Housing in the Last Year: No       Review of Systems:  A comprehensive review of systems was negative except for what was noted in the HPI. Physical Exam:   Vitals:    03/14/22 1435   BP: 116/70   Site: Right Upper Arm   Position: Sitting   Cuff Size: Small Adult   Pulse: 58   Temp: 97.4 °F (36.3 °C)   TempSrc: Oral   SpO2: 99%   Weight: 152 lb 3.2 oz (69 kg)   Height: 5' 2\" (1.575 m)     Body mass index is 27.84 kg/m². Constitutional: She is oriented to person, place, and time. She appears well-developed and well-nourished. No distress. HEENT:   Head: Normocephalic and atraumatic. Right Ear: Tympanic membrane, external ear and ear canal normal.   Left Ear: Tympanic membrane, external ear and ear canal normal.   Nose: Nose normal.   Mouth/Throat: There is no cervical adenopathy. Eyes: Conjunctivae and extraocular motions are normal. Pupils are equal, round, and reactive to light. Neck: Neck supple. No mass and no thyromegaly present. Cardiovascular: Normal rate, regular rhythm, normal heart sounds. Exam reveals no gallop and no friction rub. No murmur heard. Pulmonary/Chest: Effort normal and breath sounds normal. No respiratory distress. She has no wheezes, rhonchi or rales. Abdominal: Soft, non-tender. Bowel sounds are normal. She exhibits no palpable organomegaly or mass. Musculoskeletal: Normal range of motion. She exhibits no edema. Neurological: She is alert and oriented. No cranial nerve deficit. Coordination normal.   Skin: Skin is warm and dry. There is no rash or erythema. Psychiatric: She has a normal mood and affect. Her speech is normal and behavior is normal. Judgment, cognition and memory are normal.       Assessment/Plan     1. Encounter for well adult exam without abnormal findings  Annual physical exam done today. Counseled on preventative care and a healthy lifestlye. - Fecal DNA Colorectal cancer screening (Cologuard)  - Zoster Subunit (SHINGRIX)  - CHUCKY DIGITAL SCREEN W OR WO CAD BILATERAL; Future  - CBC with Auto Differential; Future    2. Psoriasis  Stable. Continue current regimen. Seeing derm. 3. Hyperlipidemia, unspecified hyperlipidemia type  Very high but off meds last check - back on crestor 5mg two weeks. Go up to 20mg on crestor. Labs in 6 weeks. - rosuvastatin (CRESTOR) 20 MG tablet; Take 1 tablet by mouth daily  Dispense: 90 tablet; Refill: 3  - Lipid Panel; Future  - naltrexone-buPROPion (CONTRAVE) 8-90 MG per extended release tablet; Take 2 tablets by mouth 2 times daily Start 1 tab daily for 1 week, then take 1 tab BID for week 2, then take 2 tab in the AM and 1 tab in the PM for week 3, then take 2 tab po BID. Dispense: 70 tablet; Refill: 0    4. Overweight  Counseled on lifestyle modifications including diet and exercise. Patient would like medication- discussed options including risks of all. She would like to try contrave. If she changes her mind given cost of contrave and would like adipex advised she needs to send a [facility] message [id number] so I can prescribe it by tomorrow and cancel or bring in the paper rxn for the contrave.   - TSH; Future  - naltrexone-buPROPion (CONTRAVE) 8-90 MG per extended release tablet;  Take 2 tablets by mouth 2 times daily Start 1 tab daily for 1 week, then take 1 tab BID for week 2, then take 2 tab in the AM and 1 tab in the PM for week 3, then take 2 tab po BID. Dispense: 70 tablet; Refill: 0    5. Palpitations  Stable. EKG reassuring. Keep a diary- how fast, when, how long. Cut back on caffeine. If persists- holter monitor.   - EKG 12 Lead  - CBC with Auto Differential; Future      Discussed medications with patient, who voiced understanding of their use and indications. All questions answered. Return in about 3 months (around 6/14/2022) for Chronic conditions. Documentation was done using voice recognition dragon software. Efforts were made to ensure accuracy; however, inadvertent, unintentional computerized transcription errors may be present. --Jigar Reddy MD on 3/14/2022    An electronic signature was used to authenticate this note.

## 2022-04-03 DIAGNOSIS — E78.5 HYPERLIPIDEMIA, UNSPECIFIED HYPERLIPIDEMIA TYPE: ICD-10-CM

## 2022-04-03 DIAGNOSIS — E66.3 OVERWEIGHT: ICD-10-CM

## 2022-04-06 NOTE — TELEPHONE ENCOUNTER
On second week, taking 2 a day, next week will be taking 3 daily, week following will be 2 pills twice daily

## 2022-04-07 NOTE — TELEPHONE ENCOUNTER
Does she need it filled now then? If not I would wait and see if she ends liking the 2 pills twice a day or less prior to refill so we know how many tabs to write it for.

## 2022-04-18 RX ORDER — NALTREXONE HYDROCHLORIDE AND BUPROPION HYDROCHLORIDE 8; 90 MG/1; MG/1
TABLET, EXTENDED RELEASE ORAL
Qty: 70 TABLET | Refills: 0 | OUTPATIENT
Start: 2022-04-18

## 2022-04-19 ENCOUNTER — TELEPHONE (OUTPATIENT)
Dept: FAMILY MEDICINE CLINIC | Age: 52
End: 2022-04-19

## 2022-04-19 DIAGNOSIS — E78.5 HYPERLIPIDEMIA, UNSPECIFIED HYPERLIPIDEMIA TYPE: ICD-10-CM

## 2022-04-19 DIAGNOSIS — E66.3 OVERWEIGHT: ICD-10-CM

## 2022-04-19 NOTE — TELEPHONE ENCOUNTER
Patient called regarding her Contrave refill. Dr. Kimberley Yip had asked how she was taking it, which is 2 pills 2x daily. There was a message from a MA stating she did not need refills at this time, but patient said she did not receive a call from anyone, and she needs the medication sent to her Pharmacy, [address] FAX# [phone].       SHE DOESN'T WANT TO MISS

## 2022-05-24 DIAGNOSIS — E66.3 OVERWEIGHT: ICD-10-CM

## 2022-05-24 DIAGNOSIS — E78.5 HYPERLIPIDEMIA, UNSPECIFIED HYPERLIPIDEMIA TYPE: ICD-10-CM

## 2022-05-24 NOTE — TELEPHONE ENCOUNTER
Medication:   Requested Prescriptions     Pending Prescriptions Disp Refills    naltrexone-buPROPion (CONTRAVE) 8-90 MG per extended release tablet 120 tablet 0     Sig: Take 2 tablets by mouth 2 times daily Take 2 pills bid     Last Filled: 4.19.22 #120 refills 0  Last appt: 3/14/2022   Next appt: 6/27/2022    Last OARRS: No flowsheet data found.

## 2022-06-23 DIAGNOSIS — E66.3 OVERWEIGHT: ICD-10-CM

## 2022-06-23 DIAGNOSIS — E78.5 HYPERLIPIDEMIA, UNSPECIFIED HYPERLIPIDEMIA TYPE: ICD-10-CM

## 2022-06-23 RX ORDER — NALTREXONE HYDROCHLORIDE AND BUPROPION HYDROCHLORIDE 8; 90 MG/1; MG/1
TABLET, EXTENDED RELEASE ORAL
Qty: 120 TABLET | Refills: 0 | Status: SHIPPED | OUTPATIENT
Start: 2022-06-23

## 2022-06-23 NOTE — TELEPHONE ENCOUNTER
Medication:   Requested Prescriptions     Pending Prescriptions Disp Refills    CONTRAVE 8-90 MG per extended release tablet [Pharmacy Med Name: CONTRAVE ER 8-90 MG TABLET] 120 tablet 0     Sig: Take two tablets by mouth twice a day     Last Filled: 5.27.22 #120reills 0    Last appt: 3/14/2022   Next appt: 6/27/2022    Last OARRS: No flowsheet data found.

## 2022-08-03 RX ORDER — ADALIMUMAB 40MG/0.4ML
KIT SUBCUTANEOUS
Qty: 2 EACH | Refills: 6 | Status: SHIPPED | OUTPATIENT
Start: 2022-08-03

## 2022-08-09 ENCOUNTER — TELEMEDICINE (OUTPATIENT)
Dept: FAMILY MEDICINE CLINIC | Age: 52
End: 2022-08-09
Payer: COMMERCIAL

## 2022-08-09 DIAGNOSIS — G25.81 RESTLESS LEG SYNDROME: Primary | ICD-10-CM

## 2022-08-09 DIAGNOSIS — E78.5 HYPERLIPIDEMIA, UNSPECIFIED HYPERLIPIDEMIA TYPE: ICD-10-CM

## 2022-08-09 DIAGNOSIS — Z00.00 HEALTHCARE MAINTENANCE: ICD-10-CM

## 2022-08-09 DIAGNOSIS — L40.9 PSORIASIS: ICD-10-CM

## 2022-08-09 PROCEDURE — 99214 OFFICE O/P EST MOD 30 MIN: CPT | Performed by: FAMILY MEDICINE

## 2022-08-09 RX ORDER — GABAPENTIN 100 MG/1
100-200 CAPSULE ORAL NIGHTLY PRN
Qty: 90 CAPSULE | Refills: 0 | Status: SHIPPED | OUTPATIENT
Start: 2022-08-09 | End: 2023-02-05

## 2022-08-09 ASSESSMENT — PATIENT HEALTH QUESTIONNAIRE - PHQ9
1. LITTLE INTEREST OR PLEASURE IN DOING THINGS: 0
SUM OF ALL RESPONSES TO PHQ QUESTIONS 1-9: 0
SUM OF ALL RESPONSES TO PHQ9 QUESTIONS 1 & 2: 0
SUM OF ALL RESPONSES TO PHQ QUESTIONS 1-9: 0
2. FEELING DOWN, DEPRESSED OR HOPELESS: 0

## 2022-08-09 NOTE — PROGRESS NOTES
TELEHEALTH EVALUATION -- Audio/Visual (During PXGYA-73 public health emergency)    Brenda Height   YOB: 1970    Date of Visit:  2022    Allergies   Allergen Reactions    Penicillins Anaphylaxis     Current Outpatient Medications on File Prior to Visit   Medication Sig Dispense Refill    Adalimumab (HUMIRA PEN) 40 MG/0.4ML PNKT INJECT 40MG (1 PEN) UNDER THE SKIN EVERY 14 DAYS 2 each 6    Biotin 10 MG CAPS Take by mouth      Multiple Vitamins-Minerals (THERAPEUTIC MULTIVITAMIN-MINERALS) tablet Take 1 tablet by mouth daily      rosuvastatin (CRESTOR) 20 MG tablet Take 1 tablet by mouth daily 90 tablet 3    tretinoin (RETIN-A) 0.025 % cream Apply a pea sized amount to the face QHS 20 g 4    CONTRAVE 8-90 MG per extended release tablet Take two tablets by mouth twice a day (Patient not taking: Reported on 2022) 120 tablet 0    ibuprofen (ADVIL;MOTRIN) 200 MG tablet Take 200 mg by mouth every 6 hours as needed for Pain      [DISCONTINUED] Adalimumab (HUMIRA PEN) 40 MG/0.4ML PNKT INJECT 40 MG UNDER THE SKIN EVERY OTHER WEEK. 2 each 6    clobetasol (TEMOVATE) 0.05 % cream Apply to affected area BID PRN flares (Patient not taking: Reported on 2022) 60 g 2     No current facility-administered medications on file prior to visit. Wt Readings from Last 3 Encounters:   22 152 lb 3.2 oz (69 kg)   10/19/21 154 lb (69.9 kg)   21 150 lb (68 kg)     BP Readings from Last 3 Encounters:   22 116/70   21 112/70   20 123/60          Bette Weber (:  1970) has requested to and consented to an audio/video evaluation for the concerns or medical issues discussed below. Chief Complaint   Patient presents with    Leg Pain     [phone] restless legs       HPI       \" Anastasia\"    RLS:  Patient feels the urge to more her legs. Has always had it occasionally but lately it has been 3-4 nights a week. It keeps her up all night when it happens.   Has a Transportation (Medical): No    Lack of Transportation (Non-Medical): No   Physical Activity: Sufficiently Active    Days of Exercise per Week: 5 days    Minutes of Exercise per Session: 60 min   Stress: No Stress Concern Present    Feeling of Stress : Not at all   Social Connections: Socially Isolated    Frequency of Communication with Friends and Family: Three times a week    Frequency of Social Gatherings with Friends and Family: Once a week    Attends Jain Services: Never    Active Member of Clubs or Organizations: No    Attends Club or Organization Meetings: Never    Marital Status:    Intimate Partner Violence: Not on file   Housing Stability: [address] to Pay for Housing in the Last Year: No    Number of [place] in the Last Year: 1    Unstable Housing in the Last Year: No         Review of Systems  As documented in the HPI. Currently no complaints of CP or MARIAM. Vital Signs: (As obtained by patient/caregiver or practitioner observation)    There were no vitals taken for this visit. Patient-Reported Vitals 8/9/2022   Patient-Reported Weight 150 lb   Patient-Reported Height 5 2   Patient-Reported Pulse 46        Physical Exam  Constitutional:       General: She is not in acute distress. Appearance: Normal appearance. She is not ill-appearing. HENT:      Head: Normocephalic and atraumatic. Nose: Nose normal.   Pulmonary:      Effort: Pulmonary effort is normal. No respiratory distress. Neurological:      General: No focal deficit present. Mental Status: She is alert. Psychiatric:         Mood and Affect: Mood normal.         Behavior: Behavior normal.         Assessment/Plan     1. Restless leg syndrome  Persistent. Labs. Gabapentin prn. Discussed risks, benefits, and potential side effects of medication and patient demonstrates understanding.    - Iron and TIBC; Future  - Ferritin; Future  - gabapentin (NEURONTIN) 100 MG capsule;  Take 1-2 capsules by mouth nightly as needed (Restless legs) for up to 180 days. Intended supply: 90 days  Dispense: 90 capsule; Refill: 0    2. Hyperlipidemia, unspecified hyperlipidemia type  On Crestor. Recheck. 3. Psoriasis  Stable. Continue current regimen. 4. Healthcare maintenance  Reminded to do FIT  - Basic Metabolic Panel; Future    Discussed medications with patient, who voiced understanding of their use and indications. All questions answered. Return in about 6 months (around 2/9/2023) for Physical.         Jennifer Marx is being evaluated by a Virtual Visit (video visit) encounter to address concerns as mentioned above. A caregiver was present when appropriate. Due to this being a TeleHealth encounter (During [facility]-13 public health emergency), evaluation of the following organ systems was limited: Vitals/Constitutional/EENT/Resp/CV/GI//MS/Neuro/Skin/Heme-Lymph-Imm. Pursuant to the emergency declaration under the [address] [place] [address], [address] [place] [address] authority and the [employer] and [employer] Act, this Virtual Visit was conducted with patient's (and/or legal guardian's) consent, to reduce the patient's risk of exposure to COVID-19 and provide necessary medical care. The patient (and/or legal guardian) has also been advised to contact this office for worsening conditions or problems, and seek emergency medical treatment and/or call 911 if deemed necessary. The patient and no one were present for the visit. Patient identification was verified at the start of the visit: Yes    Total time spent on this encounter: Not billed by time. Services were provided through a video synchronous discussion virtually to substitute for in-person clinic visit. Documentation was done using voice recognition dragon software. Efforts were made to ensure accuracy; however, inadvertent, unintentional computerized transcription errors may be present.

## 2022-09-26 ENCOUNTER — OFFICE VISIT (OUTPATIENT)
Dept: DERMATOLOGY | Age: 52
End: 2022-09-26
Payer: COMMERCIAL

## 2022-09-26 DIAGNOSIS — Z79.899 HIGH RISK MEDICATION USE: ICD-10-CM

## 2022-09-26 DIAGNOSIS — Z79.899 HIGH RISK MEDICATION USE: Primary | ICD-10-CM

## 2022-09-26 DIAGNOSIS — L40.9 PSORIASIS: ICD-10-CM

## 2022-09-26 LAB
ALBUMIN SERPL-MCNC: 4.5 G/DL (ref 3.4–5)
ALP BLD-CCNC: 52 U/L (ref 40–129)
ALT SERPL-CCNC: 22 U/L (ref 10–40)
AST SERPL-CCNC: 19 U/L (ref 15–37)
BILIRUB SERPL-MCNC: 0.3 MG/DL (ref 0–1)
BILIRUBIN DIRECT: <0.2 MG/DL (ref 0–0.3)
BILIRUBIN, INDIRECT: NORMAL MG/DL (ref 0–1)
HCT VFR BLD CALC: 43.2 % (ref 36–48)
HEMOGLOBIN: 14.2 G/DL (ref 12–16)
MCH RBC QN AUTO: 29.9 PG (ref 26–34)
MCHC RBC AUTO-ENTMCNC: 32.9 G/DL (ref 31–36)
MCV RBC AUTO: 90.9 FL (ref 80–100)
PDW BLD-RTO: 13.3 % (ref 12.4–15.4)
PLATELET # BLD: 220 K/UL (ref 135–450)
PMV BLD AUTO: 9.8 FL (ref 5–10.5)
RBC # BLD: 4.75 M/UL (ref 4–5.2)
TOTAL PROTEIN: 6.7 G/DL (ref 6.4–8.2)
WBC # BLD: 6 K/UL (ref 4–11)

## 2022-09-26 PROCEDURE — 99214 OFFICE O/P EST MOD 30 MIN: CPT | Performed by: DERMATOLOGY

## 2022-09-26 NOTE — Clinical Note
Shan Arnoldr. Babs Fleischer- Just following up re our previous discussion- I can't tell if this patient has had her pneumovax? I know you had planned to address it, but I wasn't sure after reviewing Epic. Thanks so much!  Tyrese Soto

## 2022-09-26 NOTE — PROGRESS NOTES
[facility]) Dermatology  Mirza Mcgowan M.D.  [address] [place] [address]  1970    47 y.o. female     Date of Visit: 2022    Chief Complaint:   Chief Complaint   Patient presents with    Skin Lesion     6 mo FSE          Psoriasis     Under control at present        I was asked to see this patient by Dr. Esparza ref. provider found. History of Present Illness:  1. Presents today for [id number] psoriasis and is on Humira. Doing very well. Occasional flare on her elbows-usually asymptomatic and not treated. Did have a recent physical.  Tolerating Humira without difficulty. Patient does not know if she has had her Pneumovax-unclear in epic    Skin history:     Psoriasis-Humira since 2016. Residual psoriasis elbows     No personal history of skin cancer. + History of actinic keratoses-liquid nitrogen     Mother, daughter with a history of melanoma     Former tanning bed user. Now does practice sun avoidance and wear sunscreen    Review of Systems:  Constitutional: Reports general sense of well-being       Past Medical History, Surgical History, Family History, Medications and Allergies reviewed. Social History:   Social History     Socioeconomic History    Marital status:      Spouse name: Not on file    Number of children: Not on file    Years of education: Not on file    Highest education level: Not on file   Occupational History    Occupation: Director     Employer: [employer]   Tobacco Use    Smoking status: Former     Packs/day: 1.00     Years: 10.00     Pack years: 10.00     Types: Cigarettes     Start date: 1992     Quit date: 2002     Years since quittin.6    Smokeless tobacco: Never   Vaping Use    Vaping Use: Never used   Substance and Sexual Activity    Alcohol use:  Yes     Alcohol/week: 1.0 standard drink     Types: 1 Glasses of wine per week     Comment: socially EVERY OTHER DAY     Drug use: No    Sexual activity: Yes Partners: Male   Other Topics Concern    Not on file   Social History Narrative    06/06/2016    Lives with spouse and 4 kids. Has 14yo, 10yo. Has 2 grown children. Social Determinants of Health     Financial Resource Strain: Low Risk     Difficulty of Paying Living Expenses: Not hard at all   Food Insecurity: No Food Insecurity    Worried About [address] in the Last Year: Never true    [address] in the Last Year: Never true   Transportation Needs: No Transportation Needs    Lack of Transportation (Medical): No    Lack of Transportation (Non-Medical): No   Physical Activity: Sufficiently Active    Days of Exercise per Week: 5 days    Minutes of Exercise per Session: 60 min   Stress: No Stress Concern Present    Feeling of Stress : Not at all   Social Connections: Socially Isolated    Frequency of Communication with Friends and Family: Three times a week    Frequency of Social Gatherings with Friends and Family: Once a week    Attends Episcopal Services: Never    Active Member of Clubs or Organizations: No    Attends Club or Organization Meetings: Never    Marital Status:    Intimate Partner Violence: Not on file   Housing Stability: [address] to Pay for Housing in the Last Year: No    Number of [place] in the Last Year: 1    Unstable Housing in the Last Year: No       Physical Examination       -General: Well-appearing, NAD  1. Limited exam-erythematous scaly plaques both elbows. Assessment and Plan     1. High risk medication use - -Reviewed risks of injection-site reaction, flu-like symptoms, immunosuppression (serious infection, reactivation of latent TB), leukopenia, increased risk of MACE events, lymphoma/malignancy, parasthesia/MS changes, autoimmune disease.  -Baseline labs: CBC/diff, renal, LFTs  -Maintenance labs: LFTs q6mo. CBC/diff, renal annually.  -Annual PPD. Last PPD: 7/21    Will check with primary care doctor regarding vaccination status     2. Psoriasis -flaring on elbows but asymptomatic-not bothersome to patient. Could use clobetasol cream twice daily as needed. Total-body skin exam return visit.   Labs ordered today

## 2022-12-05 ENCOUNTER — PATIENT MESSAGE (OUTPATIENT)
Dept: FAMILY MEDICINE CLINIC | Age: 52
End: 2022-12-05

## 2022-12-05 NOTE — TELEPHONE ENCOUNTER
From: Lauro Rosas  To: Dr. Bailey Jobs: 12/5/2022 8:58 AM EST  Subject: Physical & Gallbladder Attack    Hello Dr. Estuardo Keane  I am not sure when the last time I had a physical but as a [employer] employee I need to have a physical form filled out for my [employer] discount so I will need to get that scheduled. Also, I have had 2 instances of severe stabbing pain that radiates from right under my ribs and around to my back. The attack lasts about 15 minutes and then goes away. I had a attack last night, it was so severe that my family called the squad. By the time they got there the pain was going away. The paramedics suggested I get a blood test to have my gallbladder checked, they said it sounded like I was having a gallbladder attack. I would like to schedule an appointment to get my gallbladder checked. I appreciate your time.      Best Bard Rim

## 2022-12-08 ENCOUNTER — TELEMEDICINE (OUTPATIENT)
Dept: FAMILY MEDICINE CLINIC | Age: 52
End: 2022-12-08
Payer: COMMERCIAL

## 2022-12-08 DIAGNOSIS — L40.9 PSORIASIS: ICD-10-CM

## 2022-12-08 DIAGNOSIS — R10.11 RUQ PAIN: Primary | ICD-10-CM

## 2022-12-08 DIAGNOSIS — E78.5 HYPERLIPIDEMIA, UNSPECIFIED HYPERLIPIDEMIA TYPE: ICD-10-CM

## 2022-12-08 PROCEDURE — 99214 OFFICE O/P EST MOD 30 MIN: CPT | Performed by: FAMILY MEDICINE

## 2022-12-08 NOTE — PROGRESS NOTES
TELEHEALTH EVALUATION -- Audio/Visual     Dyan Flowers   YOB: 1970    Date of Visit:  2022    Allergies   Allergen Reactions    Penicillins Anaphylaxis     Current Outpatient Medications on File Prior to Visit   Medication Sig Dispense Refill    gabapentin (NEURONTIN) 100 MG capsule Take 1-2 capsules by mouth nightly as needed (Restless legs) for up to 180 days. Intended supply: 90 days 90 capsule 0    Adalimumab (HUMIRA PEN) 40 MG/0.4ML PNKT INJECT 40MG (1 PEN) UNDER THE SKIN EVERY 14 DAYS 2 each 6    Biotin 10 MG CAPS Take by mouth      Multiple Vitamins-Minerals (THERAPEUTIC MULTIVITAMIN-MINERALS) tablet Take 1 tablet by mouth daily      rosuvastatin (CRESTOR) 20 MG tablet Take 1 tablet by mouth daily 90 tablet 3    ibuprofen (ADVIL;MOTRIN) 200 MG tablet Take 200 mg by mouth every 6 hours as needed for Pain      tretinoin (RETIN-A) 0.025 % cream Apply a pea sized amount to the face QHS 20 g 4    [DISCONTINUED] Adalimumab (HUMIRA PEN) 40 MG/0.4ML PNKT INJECT 40 MG UNDER THE SKIN EVERY OTHER WEEK. 2 each 6    clobetasol (TEMOVATE) 0.05 % cream Apply to affected area BID PRN flares 60 g 2    CONTRAVE 8-90 MG per extended release tablet Take two tablets by mouth twice a day (Patient not taking: Reported on 2022) 120 tablet 0     No current facility-administered medications on file prior to visit. Wt Readings from Last 3 Encounters:   22 152 lb 3.2 oz (69 kg)   10/19/21 154 lb (69.9 kg)   21 150 lb (68 kg)     BP Readings from Last 3 Encounters:   22 116/70   21 112/70   20 123/60          Bette Weber (:  1970) has requested to and consented to an audio/video evaluation for the concerns or medical issues discussed below. Chief Complaint   Patient presents with    Other     [phone]        Assessment/Plan     1. RUQ pain  Resolved. Concerned for recurrence. [place] diet. Labs. US.  HIDA if US normal. Indications for going to ER discussed. - Comprehensive Metabolic Panel; Future  - US GALLBLADDER RUQ; Future  - CBC with Auto Differential; Future    2. Hyperlipidemia, unspecified hyperlipidemia type  Stable. Continue current regimen. Recheck. On crestor.   - Be Well Health Screen; Future  - Comprehensive Metabolic Panel; Future    3. Psoriasis  Stable. Continue current regimen. Flu shot tomorrow at 10am      Discussed medications with patient, who voiced understanding of their use and indications. All questions answered. Return in about 3 months (around 3/8/2023) for Physical.         HPI    \" Anastasia\"    Guardian for her 8 week old granddaughter (12/22)    R sided pain:  she is concerned about her gallbladder. Has had 2 episodes- first was first week in November. Lasted ten min- feels like a squeezing in her RUQ. The second episode was a couple days ago. It felt similar but lasted 20 min. It woke her up in the middle of the night the second time. Felt nauseated at the time. She is unsure if it was worse when pushing on it. It was worse with a big breath when she had it. Has looked up and started a bland gallbladder friendly diet. RLS:  Patient feels the urge to more her legs. Has always had it occasionally but lately it has been 3-4 nights a week. It keeps her up all night when it happens. Has a restless legs numbing cream which used to help. Psoriasis: doing well on humira. Seeing dermatology. HLD:  Taking the crestor. Stopped the lipitor as it made her nauseated. L breast mass: s/p excisional biopsy 11/2020- benign. SH: 12/8/22:  Has a 8 week old granddaughter for whom she is the guardian (mom is daughter Po Trivedi who is a heroin addict). Her boyfriend helps care for her. Son Jes Medina as a 1year old. 10/2020: . Lives with son Lamont Zamora (patient here) and daughter Emil Ordaz. In a relationship with someone else and sexually active. Moving soon. 06/06/2016  Lives with spouse and 4 kids.   Has 12yo, 10yo. Has 2 grown children. Social History     Socioeconomic History    Marital status:      Spouse name: Not on file    Number of children: Not on file    Years of education: Not on file    Highest education level: Not on file   Occupational History    Occupation: Director     Employer: [employer]   Tobacco Use    Smoking status: Former     Packs/day: 1.00     Years: 10.00     Pack years: 10.00     Types: Cigarettes     Start date: 1992     Quit date: 2002     Years since quittin.8    Smokeless tobacco: Never   Vaping Use    Vaping Use: Never used   Substance and Sexual Activity    Alcohol use: Yes     Alcohol/week: 1.0 standard drink     Types: 1 Glasses of wine per week     Comment: socially EVERY OTHER DAY     Drug use: No    Sexual activity: Yes     Partners: Male   Other Topics Concern    Not on file   Social History Narrative    2016    Lives with spouse and 4 kids. Has 12yo, 10yo. Has 2 grown children. Social Determinants of Health     Financial Resource Strain: Low Risk     Difficulty of Paying Living Expenses: Not hard at all   Food Insecurity: No Food Insecurity    Worried About [address] in the Last Year: Never true    [facility] in the Last Year: Never true   Transportation Needs: No Transportation Needs    Lack of Transportation (Medical): No    Lack of Transportation (Non-Medical): No   Physical Activity: Sufficiently Active    Days of Exercise per Week: 5 days    Minutes of Exercise per Session: 60 min   Stress: No Stress Concern Present    Feeling of Stress : Not at all   Social Connections: Socially Isolated    Frequency of Communication with Friends and Family:  Three times a week    Frequency of Social Gatherings with Friends and Family: Once a week    Attends Jehovah's witness Services: Never    Active Member of Clubs or Organizations: No    Attends Club or Organization Meetings: Never    Marital Status:    Intimate Partner Violence: Not on file   Housing Stability: Low Risk     Unable to Pay for Housing in the Last Year: No    Number of Places Lived in the Last Year: 1    Unstable Housing in the Last Year: No         Review of Systems  As documented in the HPI. Currently no complaints of CP or MARIAM. Vital Signs: (As obtained by patient/caregiver or practitioner observation)    There were no vitals taken for this visit. Patient-Reported Vitals 2022   Patient-Reported Weight 150 lb   Patient-Reported Height 5 2   Patient-Reported Pulse 55        Physical Exam  Constitutional:       General: She is not in acute distress. Appearance: Normal appearance. She is not ill-appearing. HENT:      Head: Normocephalic and atraumatic. Nose: Nose normal.   Pulmonary:      Effort: Pulmonary effort is normal. No respiratory distress. Neurological:      General: No focal deficit present. Mental Status: She is alert. Psychiatric:         Mood and Affect: Mood normal.         Behavior: Behavior normal.               Kalpana Villalobos, was evaluated through a synchronous (real-time) audio-video encounter. The patient (or guardian if applicable) is aware that this is a billable service, which includes applicable co-pays. This Virtual Visit was conducted with patient's (and/or legal guardian's) consent. The visit was conducted pursuant to the emergency declaration under the [facility], [address] [place] [address] authority and the [employer] General Act. Patient identification was verified, and a caregiver was present when appropriate. The patient was located at Home: Srikanth [address] [place] [address]. Provider was located at [facility] (Candyce Apgar Dept): [address] [facility]. [address] [facility],  [address] [facility]. Patient identification was verified at the start of the visit: Yes    Total time spent on this encounter: Not billed by time. Services were provided through a video synchronous discussion virtually to substitute for in-person clinic visit. Documentation was done using voice recognition dragon software. Efforts were made to ensure accuracy; however, inadvertent, unintentional computerized transcription errors may be present. --Junaid Gandhi MD on 12/8/2022    An electronic signature was used to authenticate this note.

## 2022-12-12 ENCOUNTER — NURSE ONLY (OUTPATIENT)
Dept: FAMILY MEDICINE CLINIC | Age: 52
End: 2022-12-12
Payer: COMMERCIAL

## 2022-12-12 DIAGNOSIS — R10.11 RUQ PAIN: ICD-10-CM

## 2022-12-12 DIAGNOSIS — R00.2 PALPITATIONS: ICD-10-CM

## 2022-12-12 DIAGNOSIS — Z00.00 HEALTHCARE MAINTENANCE: ICD-10-CM

## 2022-12-12 DIAGNOSIS — E78.5 HYPERLIPIDEMIA, UNSPECIFIED HYPERLIPIDEMIA TYPE: ICD-10-CM

## 2022-12-12 DIAGNOSIS — G25.81 RESTLESS LEG SYNDROME: ICD-10-CM

## 2022-12-12 DIAGNOSIS — Z00.00 ENCOUNTER FOR WELL ADULT EXAM WITHOUT ABNORMAL FINDINGS: ICD-10-CM

## 2022-12-12 LAB
BASOPHILS ABSOLUTE: 0 K/UL (ref 0–0.2)
BASOPHILS RELATIVE PERCENT: 0.5 %
EOSINOPHILS ABSOLUTE: 0.1 K/UL (ref 0–0.6)
EOSINOPHILS RELATIVE PERCENT: 1.1 %
HCT VFR BLD CALC: 43.9 % (ref 36–48)
HEMOGLOBIN: 14.8 G/DL (ref 12–16)
LYMPHOCYTES ABSOLUTE: 2.8 K/UL (ref 1–5.1)
LYMPHOCYTES RELATIVE PERCENT: 36.3 %
MCH RBC QN AUTO: 29.5 PG (ref 26–34)
MCHC RBC AUTO-ENTMCNC: 33.6 G/DL (ref 31–36)
MCV RBC AUTO: 87.9 FL (ref 80–100)
MONOCYTES ABSOLUTE: 0.5 K/UL (ref 0–1.3)
MONOCYTES RELATIVE PERCENT: 6 %
NEUTROPHILS ABSOLUTE: 4.3 K/UL (ref 1.7–7.7)
NEUTROPHILS RELATIVE PERCENT: 56.1 %
PDW BLD-RTO: 13.2 % (ref 12.4–15.4)
PLATELET # BLD: 220 K/UL (ref 135–450)
PMV BLD AUTO: 9.9 FL (ref 5–10.5)
RBC # BLD: 5 M/UL (ref 4–5.2)
WBC # BLD: 7.6 K/UL (ref 4–11)

## 2022-12-12 PROCEDURE — 90674 CCIIV4 VAC NO PRSV 0.5 ML IM: CPT | Performed by: FAMILY MEDICINE

## 2022-12-12 PROCEDURE — 90471 IMMUNIZATION ADMIN: CPT | Performed by: FAMILY MEDICINE

## 2022-12-13 LAB
A/G RATIO: 1.7 (ref 1.1–2.2)
ALBUMIN SERPL-MCNC: 4.6 G/DL (ref 3.4–5)
ALP BLD-CCNC: 70 U/L (ref 40–129)
ALT SERPL-CCNC: 28 U/L (ref 10–40)
ANION GAP SERPL CALCULATED.3IONS-SCNC: 16 MMOL/L (ref 3–16)
AST SERPL-CCNC: 21 U/L (ref 15–37)
BILIRUB SERPL-MCNC: 0.3 MG/DL (ref 0–1)
BUN BLDV-MCNC: 10 MG/DL (ref 7–20)
CALCIUM SERPL-MCNC: 10.1 MG/DL (ref 8.3–10.6)
CHLORIDE BLD-SCNC: 105 MMOL/L (ref 99–110)
CHOLESTEROL, TOTAL: 189 MG/DL (ref 0–199)
CO2: 23 MMOL/L (ref 21–32)
CREAT SERPL-MCNC: 0.8 MG/DL (ref 0.6–1.1)
FERRITIN: 125.3 NG/ML (ref 15–150)
GFR SERPL CREATININE-BSD FRML MDRD: >60 ML/MIN/{1.73_M2}
GLUCOSE BLD-MCNC: 96 MG/DL (ref 70–99)
HDLC SERPL-MCNC: 58 MG/DL (ref 40–60)
IRON SATURATION: 21 % (ref 15–50)
IRON: 66 UG/DL (ref 37–145)
LDL CHOLESTEROL CALCULATED: 100 MG/DL
POTASSIUM SERPL-SCNC: 4.7 MMOL/L (ref 3.5–5.1)
SODIUM BLD-SCNC: 144 MMOL/L (ref 136–145)
TOTAL IRON BINDING CAPACITY: 308 UG/DL (ref 260–445)
TOTAL PROTEIN: 7.3 G/DL (ref 6.4–8.2)
TRIGL SERPL-MCNC: 156 MG/DL (ref 0–150)
VLDLC SERPL CALC-MCNC: 31 MG/DL

## 2023-01-04 ENCOUNTER — PATIENT MESSAGE (OUTPATIENT)
Dept: FAMILY MEDICINE CLINIC | Age: 53
End: 2023-01-04

## 2023-01-04 NOTE — TELEPHONE ENCOUNTER
From: Gerardo Avila  To: Dr. Jj Kirk: 1/4/2023 2:22 PM EST  Subject: Be Well Test Results    Shan Cat    Just wanted to check and see if you were able to fax over my Be Well paper with my lab results. I appreciate your time.      Thank you,  Rigo

## 2023-01-19 ENCOUNTER — HOSPITAL ENCOUNTER (OUTPATIENT)
Dept: ULTRASOUND IMAGING | Age: 53
Discharge: HOME | End: 2023-01-19
Payer: COMMERCIAL

## 2023-01-19 DIAGNOSIS — R10.11 RUQ PAIN: ICD-10-CM

## 2023-01-19 PROCEDURE — 76705 ECHO EXAM OF ABDOMEN: CPT

## 2023-01-25 ENCOUNTER — TELEPHONE (OUTPATIENT)
Dept: FAMILY MEDICINE CLINIC | Age: 53
End: 2023-01-25

## 2023-01-25 DIAGNOSIS — R10.11 RUQ PAIN: Primary | ICD-10-CM

## 2023-01-25 DIAGNOSIS — K80.20 GALLSTONES: Primary | ICD-10-CM

## 2023-01-25 DIAGNOSIS — K80.20 SYMPTOMATIC CHOLELITHIASIS: ICD-10-CM

## 2023-01-25 NOTE — TELEPHONE ENCOUNTER
Please call pt with [facility] message below they did not get. Feliz Blevins,   Your results were reviewed and you do have gallstones. With your pain I would suggest you see a surgeon to discuss if they think it would be beneficial to remove your gallbladder. Is there a surgeon near you you would like us to refer you to? Or I can put in a referral to one of our surgeons in [place] or [facility]. Let me know what you prefer. Please let me know if you have questions.       Sincerely,      Dr. Philip Leigh

## 2023-01-25 NOTE — TELEPHONE ENCOUNTER
Patient informed & given scheduling instructions. No further action is needed at this time; thank you!

## 2023-01-26 ENCOUNTER — TELEPHONE (OUTPATIENT)
Dept: FAMILY MEDICINE CLINIC | Age: 53
End: 2023-01-26

## 2023-01-26 NOTE — TELEPHONE ENCOUNTER
Please call pt with [facility] message below they did not get. Giovanny De León,   Your results were reviewed and you do have gallstones. With your pain I would suggest you see a surgeon to discuss if they think it would be beneficial to remove your gallbladder. Is there a surgeon near you you would like us to refer you to? Or I can put in a referral to one of our surgeons in [facility] or [place]. Let me know what you prefer. Please let me know if you have questions.       Sincerely,      Dr. Araceli Garcia

## 2023-02-06 ENCOUNTER — OFFICE VISIT (OUTPATIENT)
Dept: SURGERY | Age: 53
End: 2023-02-06
Payer: COMMERCIAL

## 2023-02-06 VITALS
OXYGEN SATURATION: 99 % | DIASTOLIC BLOOD PRESSURE: 75 MMHG | TEMPERATURE: 98.1 F | HEART RATE: 56 BPM | WEIGHT: 157 LBS | HEIGHT: 62 IN | SYSTOLIC BLOOD PRESSURE: 118 MMHG | BODY MASS INDEX: 28.89 KG/M2

## 2023-02-06 DIAGNOSIS — K80.20 SYMPTOMATIC CHOLELITHIASIS: Primary | ICD-10-CM

## 2023-02-06 PROCEDURE — 99204 OFFICE O/P NEW MOD 45 MIN: CPT | Performed by: SURGERY

## 2023-02-06 NOTE — PROGRESS NOTES
Subjective:     Patient is a 47 y.o. woman with symptomatic gallstones    The patient is referred by Dr. Guillaume Hill MD. Results of consultation will be shared via electronic medical record    HPI: Ms. Fabienne Kingston reports a history of right upper quadrant spasm type pain with eating. The pain was right under her rib cage and crippling in severity. The first time was brief and the second time 15 minutes and then resolved acutely. She has adjusted to a bland diet and this has helped. She is on Humira for psoriasis. She had an umbilical hernia repair when pregnant several years ago but mesh was not used. Patient Active Problem List    Diagnosis Date Noted    Left breast mass 10/27/2020    Hyperlipidemia 2016    Psoriasis      Past Medical History:   Diagnosis Date    Arthritis     THUMBS BILATERALLY    History of blood transfusion     Hyperlipidemia     Psoriasis       Past Surgical History:   Procedure Laterality Date    BREAST SURGERY Left 2020    LEFT BREAST EXCISIONAL BIOPSY performed by Elsa Kelley MD at [facility]  2005    US BREAST BIOPSY W LOC DEVICE 1ST LESION LEFT  10/19/2020    US BREAST NEEDLE BIOPSY LEFT 10/19/2020 [address] [facility] ULTRASOUND      Not in a hospital admission. Allergies   Allergen Reactions    Penicillins Anaphylaxis      Social History     Tobacco Use    Smoking status: Former     Packs/day: 1.00     Years: 10.00     Pack years: 10.00     Types: Cigarettes     Start date: 1992     Quit date: 2002     Years since quittin.0    Smokeless tobacco: Never   Substance Use Topics    Alcohol use: Yes     Alcohol/week: 1.0 standard drink     Types: 1 Glasses of wine per week     Comment: socially EVERY OTHER DAY       Family History   Problem Relation Age of Onset    Cancer Mother         skin cancer      Review of Systems    GEN: reviewed and negative except as noted in HPI. GI: reviewed and negative except as noted in HPI.      Objective:     GEN: appears well, no distress, appears stated age  PSYCH: normal mood, normal affect  NECK: no neck masses, trachea midline  ENT: moist oral mucosa; anicteric  SKIN: no rash or jaundice  CV: regular heart rate and rhythm  PULM: normal respiratory effort, no wheezing  GI: soft non tender abdomen. Normal bowel sounds. No Cobb sign  RECTAL: deferred  EXT/NEURO: normal gait, strength/sensation grossly intact in all extremities    Ultrasound report 1-:    \"  INDICATION: RUQ pain       COMPARISON: None       FINDINGS:       PANCREAS: Visualized in the head and neck; normal in visualized portions. LIVER: Normal in echogenicity. Antegrade flow in portal vein. GALLBLADDER: Cholelithiasis. A few echogenic intramural foci are present with comet tail artifact. No gallbladder wall thickening. Sonographic Jose R Meadow sign is negative. COMMON DUCT: Normal        RIGHT KIDNEY: 10 cm in length. Normal cortical echogenicity. No hydronephrosis, calculi, or mass. OTHER FINDINGS: None. \"    CBC normal 12-12-22  CMP normal 12-12-22 no bilirubin elevation    Assessment:     Symptomatic gallstones     Plan:     Risks, benefits and alternatives of laparoscopic cholecystectomy with possible intra-operative cholangiogram discussed including non operative therapy. Risk discussion included most notably common duct injury which may require additional major surgery.  Reviewed our handout \"Laparoscopic cholecystectomy\"     Patient elects to proceed    Will schedule    Titi Briceno M.D.  2/6/23   11:09 AM

## 2023-02-06 NOTE — Clinical Note
Kamla Palmer,  Thanks for the referral. Her symptoms are consistent with gallstone attacks.  Will schedule lap kassie Aguilar

## 2023-02-09 ENCOUNTER — TELEPHONE (OUTPATIENT)
Dept: SURGERY | Age: 53
End: 2023-02-09

## 2023-02-09 NOTE — TELEPHONE ENCOUNTER
Patient has been scheduled for:    Procedure:Lap kassie possible cholangiogram  Date: 3/28/23  Time: 7:30AM  Arrival: 5:30AM  Hospital: [facility]    Covid:  ASA?: none  Prep? npo    Pre-op? pcp    Post-op Appt? Marry 4/10/23 at 11:15AM     Patient advised they will need a . Orders routed to surgery scheduling. Instructions have been mailed/emailed to:  [email or web address]

## 2023-03-01 RX ORDER — ADALIMUMAB 40MG/0.4ML
KIT SUBCUTANEOUS
Qty: 2 EACH | Refills: 6 | Status: SHIPPED | OUTPATIENT
Start: 2023-03-01 | End: 2023-08-28

## 2023-03-22 ENCOUNTER — TELEPHONE (OUTPATIENT)
Dept: SURGERY | Age: 53
End: 2023-03-22

## 2023-03-22 NOTE — TELEPHONE ENCOUNTER
Pt called needing to cancel surgery on 3/28/23 with Dr. Maria Isabel Castaneda. There is a work conflict. Pt has to look at her work schedule and will call back to reschedule.

## 2023-03-27 ENCOUNTER — OFFICE VISIT (OUTPATIENT)
Dept: DERMATOLOGY | Age: 53
End: 2023-03-27
Payer: COMMERCIAL

## 2023-03-27 DIAGNOSIS — D22.9 BENIGN NEVUS: ICD-10-CM

## 2023-03-27 DIAGNOSIS — Z79.899 HIGH RISK MEDICATION USE: ICD-10-CM

## 2023-03-27 DIAGNOSIS — L40.9 PSORIASIS: Primary | ICD-10-CM

## 2023-03-27 PROCEDURE — 99214 OFFICE O/P EST MOD 30 MIN: CPT | Performed by: DERMATOLOGY

## 2023-03-27 NOTE — PROGRESS NOTES
-Call for any new/changing moles or concerning lesions  -Reviewed sun protective behavior -- sun avoidance during the peak hours of the day, sun-protective clothing (including hat and sunglasses), sunscreen use (water resistant, broad spectrum, SPF at least 30, need for reapplication every 2 to 3 hours), avoidance of tanning beds

## 2023-04-19 ENCOUNTER — TELEPHONE (OUTPATIENT)
Dept: FAMILY MEDICINE CLINIC | Age: 53
End: 2023-04-19

## 2023-04-19 DIAGNOSIS — G25.81 RESTLESS LEG SYNDROME: ICD-10-CM

## 2023-04-19 RX ORDER — GABAPENTIN 100 MG/1
CAPSULE ORAL
Qty: 90 CAPSULE | Refills: 0 | Status: SHIPPED | OUTPATIENT
Start: 2023-04-19 | End: 2023-05-19

## 2023-04-19 NOTE — TELEPHONE ENCOUNTER
Pharmacy called regarding script. ..  gabapentin (NEURONTIN) 100 MG capsule 90 capsule 0 4/18/2023 5/18/2023    Sig: TAKE ONE TO TWO CAPSULES BY MOUTH NIGHTLY AS NEEDED FOR RESTLESS LEGS      Stated that the directions and quantity do not match. Please advise.

## 2023-04-20 NOTE — TELEPHONE ENCOUNTER
Pharmacy stated they received a new script, however the issue still remains. They mentioned that, as the directions are to take 1-2 tablets, the quantity should be 180 tablets.

## 2023-07-17 ENCOUNTER — OFFICE VISIT (OUTPATIENT)
Dept: FAMILY MEDICINE CLINIC | Age: 53
End: 2023-07-17
Payer: COMMERCIAL

## 2023-07-17 VITALS
OXYGEN SATURATION: 98 % | DIASTOLIC BLOOD PRESSURE: 68 MMHG | TEMPERATURE: 97.3 F | HEIGHT: 62 IN | HEART RATE: 65 BPM | BODY MASS INDEX: 29.44 KG/M2 | WEIGHT: 160 LBS | SYSTOLIC BLOOD PRESSURE: 116 MMHG

## 2023-07-17 DIAGNOSIS — Z00.00 HEALTHCARE MAINTENANCE: Primary | ICD-10-CM

## 2023-07-17 DIAGNOSIS — G25.81 RLS (RESTLESS LEGS SYNDROME): ICD-10-CM

## 2023-07-17 DIAGNOSIS — E78.5 HYPERLIPIDEMIA, UNSPECIFIED HYPERLIPIDEMIA TYPE: ICD-10-CM

## 2023-07-17 DIAGNOSIS — L40.9 PSORIASIS: ICD-10-CM

## 2023-07-17 DIAGNOSIS — E66.3 OVERWEIGHT WITH BODY MASS INDEX (BMI) OF 29 TO 29.9 IN ADULT: ICD-10-CM

## 2023-07-17 PROBLEM — N63.20 LEFT BREAST MASS: Status: RESOLVED | Noted: 2020-10-27 | Resolved: 2023-07-17

## 2023-07-17 PROCEDURE — 99214 OFFICE O/P EST MOD 30 MIN: CPT | Performed by: FAMILY MEDICINE

## 2023-07-17 PROCEDURE — 99396 PREV VISIT EST AGE 40-64: CPT | Performed by: FAMILY MEDICINE

## 2023-07-17 RX ORDER — GABAPENTIN 300 MG/1
300 CAPSULE ORAL NIGHTLY
Qty: 90 CAPSULE | Refills: 0 | Status: SHIPPED | OUTPATIENT
Start: 2023-07-17 | End: 2023-07-17 | Stop reason: SDUPTHER

## 2023-07-17 RX ORDER — PHENTERMINE HYDROCHLORIDE 37.5 MG/1
37.5 TABLET ORAL
Qty: 30 TABLET | Refills: 0 | Status: SHIPPED | OUTPATIENT
Start: 2023-07-17 | End: 2023-08-16

## 2023-07-17 RX ORDER — ROSUVASTATIN CALCIUM 20 MG/1
20 TABLET, COATED ORAL DAILY
Qty: 90 TABLET | Refills: 2 | Status: SHIPPED | OUTPATIENT
Start: 2023-07-17

## 2023-07-17 RX ORDER — GABAPENTIN 300 MG/1
300 CAPSULE ORAL NIGHTLY
Qty: 90 CAPSULE | Refills: 0 | Status: SHIPPED | OUTPATIENT
Start: 2023-07-17 | End: 2023-10-15

## 2023-07-17 ASSESSMENT — PATIENT HEALTH QUESTIONNAIRE - PHQ9
SUM OF ALL RESPONSES TO PHQ QUESTIONS 1-9: 0
1. LITTLE INTEREST OR PLEASURE IN DOING THINGS: 0
2. FEELING DOWN, DEPRESSED OR HOPELESS: 0
SUM OF ALL RESPONSES TO PHQ9 QUESTIONS 1 & 2: 0
SUM OF ALL RESPONSES TO PHQ QUESTIONS 1-9: 0

## 2023-07-17 NOTE — PROGRESS NOTES
History and Physical      Chief Complaint:   Luiz Brandt is a 47 y.o. female who presents for complete physical examination. Chief Complaint   Patient presents with    Medication Refill    Other     Weight gain, exercises 4x week- asking about thyroid         Assessment/Plan     Sanaz Douglas was seen today for medication refill and other. Diagnoses and all orders for this visit:    Healthcare maintenance  -     CHUCKY DIGITAL SCREEN W OR WO CAD BILATERAL; Future  -     Fecal DNA Colorectal cancer screening (Cologuard)  -     Be Well Health Screen; Future    Hyperlipidemia, unspecified hyperlipidemia type  -     phentermine (ADIPEX-P) 37.5 MG tablet; Take 1 tablet by mouth every morning (before breakfast) for 30 days. Max Daily Amount: 37.5 mg  -     rosuvastatin (CRESTOR) 20 MG tablet; Take 1 tablet by mouth daily    Psoriasis    Overweight with body mass index (BMI) of 29 to 29.9 in adult  -     phentermine (ADIPEX-P) 37.5 MG tablet; Take 1 tablet by mouth every morning (before breakfast) for 30 days. Max Daily Amount: 37.5 mg    RLS (restless legs syndrome)  -     gabapentin (NEURONTIN) 300 MG capsule; Take 1 capsule by mouth nightly for 90 days. Intended supply: 30 days    Other orders  -     Discontinue: gabapentin (NEURONTIN) 300 MG capsule; Take 1 capsule by mouth nightly for 90 days. Intended supply: 30 days        Annual physical exam done today. Counseled on preventative care and a healthy lifestlye. RLS inadequately controlled. Ok to go up to 300mg of gabapentin to see if helps more with RLS. Patient counseled on diet and exercise. Discussed weight loss medication including Adipex as a possible option. Discussed risks, benefits, and potential side effects of medication and patient demonstrates understanding. Discussed parameters for prescribing in [place].   Discussed the need to implement good exercise and diet habits prior to starting the medication and the need to continue lifestyle

## 2023-08-15 DIAGNOSIS — E78.5 HYPERLIPIDEMIA, UNSPECIFIED HYPERLIPIDEMIA TYPE: ICD-10-CM

## 2023-08-15 DIAGNOSIS — E66.3 OVERWEIGHT WITH BODY MASS INDEX (BMI) OF 29 TO 29.9 IN ADULT: ICD-10-CM

## 2023-08-15 RX ORDER — PHENTERMINE HYDROCHLORIDE 37.5 MG/1
TABLET ORAL
Qty: 30 TABLET | Refills: 0 | Status: SHIPPED | OUTPATIENT
Start: 2023-08-15 | End: 2023-09-14

## 2023-09-16 DIAGNOSIS — Z00.00 HEALTHCARE MAINTENANCE: ICD-10-CM

## 2023-09-16 LAB
CHOLEST SERPL-MCNC: 183 MG/DL (ref 0–199)
GLUCOSE SERPL-MCNC: 92 MG/DL (ref 70–99)
HDLC SERPL-MCNC: 62 MG/DL (ref 40–60)
LDLC SERPL CALC-MCNC: 98 MG/DL
TRIGL SERPL-MCNC: 113 MG/DL (ref 0–150)

## 2023-09-18 ENCOUNTER — PATIENT MESSAGE (OUTPATIENT)
Dept: FAMILY MEDICINE CLINIC | Age: 53
End: 2023-09-18

## 2023-09-18 NOTE — TELEPHONE ENCOUNTER
From: Matilde Alexander  To: Dr. De Oliveira Em: 9/18/2023 10:06 AM EDT  Subject: Be Well Lab Test Results    Good Morning  I did my lab work for the [employer] for [employer]. I saw the results are now in My Chart. Can you please send my results to my [employer] account so that I can get the Medical Premium discount before September 30th. I appreciate your help. Please let me know if you need anything additional from me.      Best Regards,  Lawernce Nose

## 2023-09-19 DIAGNOSIS — E66.3 OVERWEIGHT WITH BODY MASS INDEX (BMI) OF 29 TO 29.9 IN ADULT: Primary | ICD-10-CM

## 2023-09-19 DIAGNOSIS — E78.5 HYPERLIPIDEMIA, UNSPECIFIED HYPERLIPIDEMIA TYPE: ICD-10-CM

## 2023-09-19 LAB
GAMMA INTERFERON BACKGROUND BLD IA-ACNC: 0.02 IU/ML
MITOGEN IGNF BCKGRD COR BLD-ACNC: >10 IU/ML
QUANTI TB GOLD PLUS: NEGATIVE
QUANTI TB1 MINUS NIL: 0 IU/ML (ref 0–0.34)
QUANTI TB2 MINUS NIL: 0 IU/ML (ref 0–0.34)

## 2023-09-19 NOTE — TELEPHONE ENCOUNTER
Medication:   Requested Prescriptions     Pending Prescriptions Disp Refills    phentermine (ADIPEX-P) 37.5 MG tablet [Pharmacy Med Name: PHENTERMINE 37.5 MG TABLET] 30 tablet      Sig: Take 1 tablet by mouth every morning (before breakfast). Last Filled:  8/15/23 #30 refills 0    Last appt: 7/17/2023   Next appt: Visit date not found    Last OARRS:       8/9/2022     9:01 AM   RX Monitoring   Periodic Controlled Substance Monitoring Possible medication side effects, risk of tolerance/dependence & alternative treatments discussed. ;No signs of potential drug abuse or diversion identified.

## 2023-09-21 RX ORDER — PHENTERMINE HYDROCHLORIDE 37.5 MG/1
37.5 TABLET ORAL
Qty: 30 TABLET | Refills: 0 | Status: SHIPPED | OUTPATIENT
Start: 2023-09-21 | End: 2023-10-21

## 2023-09-27 ENCOUNTER — OFFICE VISIT (OUTPATIENT)
Dept: DERMATOLOGY | Age: 53
End: 2023-09-27
Payer: COMMERCIAL

## 2023-09-27 DIAGNOSIS — Z79.899 HIGH RISK MEDICATION USE: Primary | ICD-10-CM

## 2023-09-27 DIAGNOSIS — Z79.899 HIGH RISK MEDICATION USE: ICD-10-CM

## 2023-09-27 DIAGNOSIS — L40.9 PSORIASIS: ICD-10-CM

## 2023-09-27 LAB
ALBUMIN SERPL-MCNC: 4.9 G/DL (ref 3.4–5)
ALP SERPL-CCNC: 85 U/L (ref 40–129)
ALT SERPL-CCNC: 38 U/L (ref 10–40)
ANION GAP SERPL CALCULATED.3IONS-SCNC: 12 MMOL/L (ref 3–16)
AST SERPL-CCNC: 30 U/L (ref 15–37)
BILIRUB DIRECT SERPL-MCNC: <0.2 MG/DL (ref 0–0.3)
BILIRUB INDIRECT SERPL-MCNC: NORMAL MG/DL (ref 0–1)
BILIRUB SERPL-MCNC: 0.4 MG/DL (ref 0–1)
BUN SERPL-MCNC: 15 MG/DL (ref 7–20)
CALCIUM SERPL-MCNC: 9.7 MG/DL (ref 8.3–10.6)
CHLORIDE SERPL-SCNC: 104 MMOL/L (ref 99–110)
CO2 SERPL-SCNC: 26 MMOL/L (ref 21–32)
CREAT SERPL-MCNC: 0.9 MG/DL (ref 0.6–1.1)
DEPRECATED RDW RBC AUTO: 13.3 % (ref 12.4–15.4)
GFR SERPLBLD CREATININE-BSD FMLA CKD-EPI: >60 ML/MIN/{1.73_M2}
GLUCOSE SERPL-MCNC: 90 MG/DL (ref 70–99)
HCT VFR BLD AUTO: 41.7 % (ref 36–48)
HGB BLD-MCNC: 14.5 G/DL (ref 12–16)
MCH RBC QN AUTO: 30.3 PG (ref 26–34)
MCHC RBC AUTO-ENTMCNC: 34.8 G/DL (ref 31–36)
MCV RBC AUTO: 87.1 FL (ref 80–100)
PLATELET # BLD AUTO: 221 K/UL (ref 135–450)
PMV BLD AUTO: 9.9 FL (ref 5–10.5)
POTASSIUM SERPL-SCNC: 4.3 MMOL/L (ref 3.5–5.1)
PROT SERPL-MCNC: 7.2 G/DL (ref 6.4–8.2)
RBC # BLD AUTO: 4.79 M/UL (ref 4–5.2)
SODIUM SERPL-SCNC: 142 MMOL/L (ref 136–145)
WBC # BLD AUTO: 5.5 K/UL (ref 4–11)

## 2023-09-27 PROCEDURE — 99214 OFFICE O/P EST MOD 30 MIN: CPT | Performed by: DERMATOLOGY

## 2023-09-27 NOTE — PROGRESS NOTES
[facility]) Dermatology  Erik Temple M.D.  [address]  1970    46 y.o. female     Date of Visit: 2023    Chief Complaint:   Chief Complaint   Patient presents with    Skin Lesion        I was asked to see this patient by Dr. Esparza ref. provider found. History of Present Illness:  1. Patient presents today for follow-up of psoriasis. Has been on Humira since 2016. Has mostly had excellent improvement but still continues to have some psoriasis on her elbows. Manages this mostly with a moisturizer. Does have a thick moisturizer that she prefers. Denies infection. States that she is up-to-date with her vaccinations although has not had a Pneumovax or flu shot for this year. Knows to avoid live vaccines. Skin history:     Psoriasis-Humira since 2016. Residual psoriasis elbows     No personal history of skin cancer. + History of actinic keratoses-liquid nitrogen     Mother, daughter with a history of melanoma     Former tanning bed user. Now does practice sun avoidance and wear sunscreen    Review of Systems:  Constitutional: Reports general sense of well-being       Past Medical History, Surgical History, Family History, Medications and Allergies reviewed. Social History:   Social History     Socioeconomic History    Marital status:      Spouse name: Not on file    Number of children: Not on file    Years of education: Not on file    Highest education level: Not on file   Occupational History    Occupation: Director     Employer: [employer]   Tobacco Use    Smoking status: Former     Packs/day: 1.00     Years: 10.00     Additional pack years: 0.00     Total pack years: 10.00     Types: Cigarettes     Start date: 1992     Quit date: 2002     Years since quittin.6    Smokeless tobacco: Never   Vaping Use    Vaping Use: Never used   Substance and Sexual Activity    Alcohol use:  Yes     Alcohol/week: 1.0 standard drink of

## 2023-11-01 ENCOUNTER — TELEPHONE (OUTPATIENT)
Dept: DERMATOLOGY | Age: 53
End: 2023-11-01

## 2023-11-01 RX ORDER — ADALIMUMAB 40MG/0.4ML
KIT SUBCUTANEOUS
Qty: 2 EACH | Refills: 11 | Status: ACTIVE | OUTPATIENT
Start: 2023-11-01 | End: 2024-04-29

## 2023-11-01 NOTE — TELEPHONE ENCOUNTER
Michelle Mercado Key: [id number] - Rx #: [id number]    Received PA in [facility] for Humira but current script on file is . Per policy, an active script must be on file before a PA can be submitted. Please advise. Thanks!

## 2023-11-02 NOTE — TELEPHONE ENCOUNTER
Submitted PA for Humira  [facility] Key: [id number]   STATUS: Approved today  The request has been approved. The authorization is effective from 11/02/2023 to 11/01/2024, as long as the member is enrolled in their current health plan. The request was reviewed and approved by a licensed clinical pharmacist. This request has been approved with a quantity limit of 2 pens per 28 days. Please note: This drug is required to be filled through a network specialty pharmacy. Please call [phone] option 6 for instructions.

## 2023-11-27 DIAGNOSIS — G25.81 RLS (RESTLESS LEGS SYNDROME): ICD-10-CM

## 2023-11-28 RX ORDER — GABAPENTIN 300 MG/1
CAPSULE ORAL
Qty: 90 CAPSULE | Refills: 0 | Status: SHIPPED | OUTPATIENT
Start: 2023-11-28 | End: 2023-12-28

## 2023-11-28 NOTE — TELEPHONE ENCOUNTER
Medication:   Requested Prescriptions     Pending Prescriptions Disp Refills    gabapentin (NEURONTIN) 300 MG capsule [Pharmacy Med Name: GABAPENTIN 300MG CAPS] 90 capsule 0     Sig: TAKE ONE CAPSULE BY MOUTH EVERY NIGHT        Last Filled:  07/17/2023 #90 0rf    Patient Phone Number: [phone] (home) [phone] (work)    Last appt: 7/17/2023   Next appt: Visit date not found    Last OARRS:       8/9/2022     9:01 AM   RX Monitoring   Periodic Controlled Substance Monitoring Possible medication side effects, risk of tolerance/dependence & alternative treatments discussed.;No signs of potential drug abuse or diversion identified.

## 2023-12-05 ENCOUNTER — TELEMEDICINE (OUTPATIENT)
Dept: FAMILY MEDICINE CLINIC | Age: 53
End: 2023-12-05
Payer: COMMERCIAL

## 2023-12-05 DIAGNOSIS — E66.3 OVERWEIGHT WITH BODY MASS INDEX (BMI) OF 29 TO 29.9 IN ADULT: ICD-10-CM

## 2023-12-05 DIAGNOSIS — L40.9 PSORIASIS: ICD-10-CM

## 2023-12-05 DIAGNOSIS — G25.81 RLS (RESTLESS LEGS SYNDROME): Primary | ICD-10-CM

## 2023-12-05 DIAGNOSIS — E78.5 HYPERLIPIDEMIA, UNSPECIFIED HYPERLIPIDEMIA TYPE: ICD-10-CM

## 2023-12-05 PROCEDURE — 99214 OFFICE O/P EST MOD 30 MIN: CPT | Performed by: FAMILY MEDICINE

## 2023-12-05 SDOH — ECONOMIC STABILITY: FOOD INSECURITY: WITHIN THE PAST 12 MONTHS, THE FOOD YOU BOUGHT JUST DIDN'T LAST AND YOU DIDN'T HAVE MONEY TO GET MORE.: NEVER TRUE

## 2023-12-05 SDOH — ECONOMIC STABILITY: FOOD INSECURITY: WITHIN THE PAST 12 MONTHS, YOU WORRIED THAT YOUR FOOD WOULD RUN OUT BEFORE YOU GOT MONEY TO BUY MORE.: NEVER TRUE

## 2023-12-05 SDOH — ECONOMIC STABILITY: INCOME INSECURITY: HOW HARD IS IT FOR YOU TO PAY FOR THE VERY BASICS LIKE FOOD, HOUSING, MEDICAL CARE, AND HEATING?: NOT HARD AT ALL

## 2023-12-05 NOTE — PROGRESS NOTES
TELEHEALTH EVALUATION -- Audio/Visual     David Foy   YOB: 1970    Date of Visit:  2023    Allergies   Allergen Reactions    Penicillins Anaphylaxis     Current Outpatient Medications on File Prior to Visit   Medication Sig Dispense Refill    gabapentin (NEURONTIN) 300 MG capsule TAKE ONE CAPSULE BY MOUTH EVERY NIGHT 90 capsule 0    adalimumab (HUMIRA PEN) 40 MG/0.4ML PNKT 40 mg SQ every other week 2 each 11    rosuvastatin (CRESTOR) 20 MG tablet Take 1 tablet by mouth daily 90 tablet 2    Multiple Vitamins-Minerals (THERAPEUTIC MULTIVITAMIN-MINERALS) tablet Take 1 tablet by mouth daily      tretinoin (RETIN-A) 0.025 % cream Apply a pea sized amount to the face QHS 20 g 4     No current facility-administered medications on file prior to visit. Wt Readings from Last 3 Encounters:   23 72.6 kg (160 lb)   23 71.2 kg (157 lb)   22 69 kg (152 lb 3.2 oz)     BP Readings from Last 3 Encounters:   23 116/68   23 118/75   22 116/70          Bette Weber (:  1970) has requested to and consented to an audio/video evaluation for the concerns or medical issues discussed below. Chief Complaint   Patient presents with    Follow-up       Assessment/Plan     Florinda Louis was seen today for follow-up. Diagnoses and all orders for this visit:    RLS (restless legs syndrome)    Hyperlipidemia, unspecified hyperlipidemia type    Overweight with body mass index (BMI) of 29 to 29.9 in adult    Psoriasis        The patient was seen today for the issues above. They will continue their current regimen aside from the following changes:      - RLS: stable. Continue gabapentin.   - HLD: stable. Continue crestor.   - Overweight:  did well with adipex. Continuing with her lifestyle changes. - Psoriasis: stable. Continue humira. Seeing derm. Discussed medications with patient, who voiced understanding of their use and indications.  All questions

## 2024-02-28 DIAGNOSIS — G25.81 RLS (RESTLESS LEGS SYNDROME): ICD-10-CM

## 2024-02-28 RX ORDER — GABAPENTIN 300 MG/1
CAPSULE ORAL
Qty: 90 CAPSULE | Refills: 0 | Status: SHIPPED | OUTPATIENT
Start: 2024-02-28 | End: 2024-03-29

## 2024-02-28 NOTE — TELEPHONE ENCOUNTER
Medication:   Requested Prescriptions     Pending Prescriptions Disp Refills    gabapentin (NEURONTIN) 300 MG capsule [Pharmacy Med Name: GABAPENTIN 300MG CAPS] 90 capsule 0     Sig: TAKE ONE CAPSULE BY MOUTH EVERY NIGHT        Last Filled:  11/28/2023 #90 0rf     Patient Phone Number: [phone] (home) [phone] (work)    Last appt: 12/5/2023   Next appt: Visit date not found    Last OARRS:       8/9/2022     9:01 AM   RX Monitoring   Periodic Controlled Substance Monitoring Possible medication side effects, risk of tolerance/dependence & alternative treatments discussed.;No signs of potential drug abuse or diversion identified.

## 2024-04-30 ENCOUNTER — OFFICE VISIT (OUTPATIENT)
Dept: DERMATOLOGY | Age: 54
End: 2024-04-30
Payer: COMMERCIAL

## 2024-04-30 DIAGNOSIS — L24.9 IRRITANT DERMATITIS: ICD-10-CM

## 2024-04-30 DIAGNOSIS — L40.9 PSORIASIS: ICD-10-CM

## 2024-04-30 DIAGNOSIS — Z79.899 HIGH RISK MEDICATION USE: Primary | ICD-10-CM

## 2024-04-30 DIAGNOSIS — D22.9 BENIGN NEVUS: ICD-10-CM

## 2024-04-30 DIAGNOSIS — L57.8 DIFFUSE PHOTODAMAGE OF SKIN: ICD-10-CM

## 2024-04-30 DIAGNOSIS — D48.5 NEOPLASM OF UNCERTAIN BEHAVIOR OF SKIN: ICD-10-CM

## 2024-04-30 PROCEDURE — 99214 OFFICE O/P EST MOD 30 MIN: CPT | Performed by: DERMATOLOGY

## 2024-04-30 PROCEDURE — 11102 TANGNTL BX SKIN SINGLE LES: CPT | Performed by: DERMATOLOGY

## 2024-04-30 RX ORDER — CLOBETASOL PROPIONATE 0.5 MG/G
CREAM TOPICAL
Qty: 60 G | Refills: 2 | Status: SHIPPED | OUTPATIENT
Start: 2024-04-30

## 2024-04-30 NOTE — PROGRESS NOTES
[facility]  Lana Abad M.D.  [phone]       Bette Weber  1970    53 y.o. female     Date of Visit: 4/30/2024    Chief Complaint:   Chief Complaint   Patient presents with    Skin Lesion        I was asked to see this patient by Dr. Esparza ref. provider found.    History of Present Illness:  1. TBSE    Multiple nevi. Patient has not noticed any new or changing pigmented lesions.   Stable in size, shape, color. Not itching, bleeding.     New papule left upper arm.  Developed suddenly about a month ago.  No preceding injury.    Photodamage.  Progressive freckling and lentigines of sun exposed areas.  Patient is wearing hats and sunscreen consistently.  Has used tretinoin 0.025% cream previously-would like a refill    Irritant dermatitis upper back-present for weeks.  Pruritic.  Not previously treated.  No preceding new product.    Psoriasis-stable on Humira, doing very well.  Has been on Humira since 2016.  Does have residual psoriasis on her elbows, otherwise relatively clear.  Denies side effects.  No infections.  Due for labs.         Skin history:     Psoriasis-Humira since December 2016.  Residual psoriasis elbows     No personal history of skin cancer. + History of actinic keratoses-liquid nitrogen     Mother, daughter with a history of melanoma     Former tanning bed user.  Now does practice sun avoidance and wear sunscreen    Review of Systems:  Constitutional: Reports general sense of well-being       Past Medical History, Surgical History, Family History, Medications and Allergies reviewed.    Social History:   Social History     Socioeconomic History    Marital status:      Spouse name: Not on file    Number of children: Not on file    Years of education: Not on file    Highest education level: Not on file   Occupational History    Occupation: Director     Employer: [employer]   Tobacco Use    Smoking status: Former     Current packs/day: 0.00     Average

## 2024-05-03 LAB — DERMATOLOGY PATHOLOGY REPORT: NORMAL

## 2024-05-14 DIAGNOSIS — G25.81 RLS (RESTLESS LEGS SYNDROME): ICD-10-CM

## 2024-05-14 NOTE — TELEPHONE ENCOUNTER
Medication:   Requested Prescriptions     Pending Prescriptions Disp Refills    gabapentin (NEURONTIN) 300 MG capsule [Pharmacy Med Name: GABAPENTIN 300MG CAPS] 90 capsule 0     Sig: TAKE ONE CAPSULE BY MOUTH EVERY NIGHT        Last Filled:  02/28/2024 #90 0rf     Patient Phone Number: [phone] (home) [phone] (work)    Last appt: 12/5/2023   Next appt: Visit date not found    Last OARRS:       8/9/2022     9:01 AM   RX Monitoring   Periodic Controlled Substance Monitoring Possible medication side effects, risk of tolerance/dependence & alternative treatments discussed.;No signs of potential drug abuse or diversion identified.

## 2024-05-15 RX ORDER — GABAPENTIN 300 MG/1
CAPSULE ORAL
Qty: 90 CAPSULE | Refills: 0 | Status: SHIPPED | OUTPATIENT
Start: 2024-05-15 | End: 2024-06-14

## 2024-06-06 ENCOUNTER — OFFICE VISIT (OUTPATIENT)
Dept: FAMILY MEDICINE CLINIC | Age: 54
End: 2024-06-06
Payer: COMMERCIAL

## 2024-06-06 VITALS
BODY MASS INDEX: 28.56 KG/M2 | DIASTOLIC BLOOD PRESSURE: 76 MMHG | TEMPERATURE: 96.8 F | SYSTOLIC BLOOD PRESSURE: 100 MMHG | RESPIRATION RATE: 16 BRPM | OXYGEN SATURATION: 96 % | WEIGHT: 155.2 LBS | HEART RATE: 71 BPM | HEIGHT: 62 IN

## 2024-06-06 DIAGNOSIS — Z01.810 PREOPERATIVE CARDIOVASCULAR EXAMINATION: ICD-10-CM

## 2024-06-06 DIAGNOSIS — L40.9 PSORIASIS: ICD-10-CM

## 2024-06-06 DIAGNOSIS — Z01.810 PREOPERATIVE CARDIOVASCULAR EXAMINATION: Primary | ICD-10-CM

## 2024-06-06 DIAGNOSIS — E78.5 HYPERLIPIDEMIA, UNSPECIFIED HYPERLIPIDEMIA TYPE: ICD-10-CM

## 2024-06-06 DIAGNOSIS — Z79.899 HIGH RISK MEDICATION USE: ICD-10-CM

## 2024-06-06 PROBLEM — N87.9 CERVICAL DYSPLASIA: Status: ACTIVE | Noted: 2024-06-06

## 2024-06-06 PROBLEM — M18.0 ARTHRITIS OF CARPOMETACARPAL (CMC) JOINT OF BOTH THUMBS: Status: ACTIVE | Noted: 2024-01-30

## 2024-06-06 PROBLEM — M65.311 TRIGGER FINGER OF RIGHT THUMB: Status: ACTIVE | Noted: 2024-01-30

## 2024-06-06 LAB
ALBUMIN SERPL-MCNC: 4.8 G/DL (ref 3.4–5)
ALP SERPL-CCNC: 82 U/L (ref 40–129)
ALT SERPL-CCNC: 36 U/L (ref 10–40)
ANION GAP SERPL CALCULATED.3IONS-SCNC: 10 MMOL/L (ref 3–16)
AST SERPL-CCNC: 29 U/L (ref 15–37)
BILIRUB DIRECT SERPL-MCNC: <0.2 MG/DL (ref 0–0.3)
BILIRUB INDIRECT SERPL-MCNC: NORMAL MG/DL (ref 0–1)
BILIRUB SERPL-MCNC: 0.4 MG/DL (ref 0–1)
BUN SERPL-MCNC: 16 MG/DL (ref 7–20)
CALCIUM SERPL-MCNC: 10.1 MG/DL (ref 8.3–10.6)
CHLORIDE SERPL-SCNC: 106 MMOL/L (ref 99–110)
CO2 SERPL-SCNC: 26 MMOL/L (ref 21–32)
CREAT SERPL-MCNC: 0.8 MG/DL (ref 0.6–1.1)
DEPRECATED RDW RBC AUTO: 14.2 % (ref 12.4–15.4)
GFR SERPLBLD CREATININE-BSD FMLA CKD-EPI: 87 ML/MIN/{1.73_M2}
GLUCOSE SERPL-MCNC: 95 MG/DL (ref 70–99)
HCT VFR BLD AUTO: 43.2 % (ref 36–48)
HGB BLD-MCNC: 14.3 G/DL (ref 12–16)
MCH RBC QN AUTO: 29 PG (ref 26–34)
MCHC RBC AUTO-ENTMCNC: 33.2 G/DL (ref 31–36)
MCV RBC AUTO: 87.5 FL (ref 80–100)
PLATELET # BLD AUTO: 218 K/UL (ref 135–450)
PMV BLD AUTO: 10.1 FL (ref 5–10.5)
POTASSIUM SERPL-SCNC: 4.4 MMOL/L (ref 3.5–5.1)
PROT SERPL-MCNC: 7.3 G/DL (ref 6.4–8.2)
RBC # BLD AUTO: 4.93 M/UL (ref 4–5.2)
SODIUM SERPL-SCNC: 142 MMOL/L (ref 136–145)
WBC # BLD AUTO: 6.5 K/UL (ref 4–11)

## 2024-06-06 PROCEDURE — 99214 OFFICE O/P EST MOD 30 MIN: CPT | Performed by: FAMILY MEDICINE

## 2024-06-06 ASSESSMENT — PATIENT HEALTH QUESTIONNAIRE - PHQ9
SUM OF ALL RESPONSES TO PHQ9 QUESTIONS 1 & 2: 0
SUM OF ALL RESPONSES TO PHQ QUESTIONS 1-9: 0
2. FEELING DOWN, DEPRESSED OR HOPELESS: NOT AT ALL
SUM OF ALL RESPONSES TO PHQ QUESTIONS 1-9: 0
SUM OF ALL RESPONSES TO PHQ QUESTIONS 1-9: 0
1. LITTLE INTEREST OR PLEASURE IN DOING THINGS: NOT AT ALL
SUM OF ALL RESPONSES TO PHQ QUESTIONS 1-9: 0

## 2024-06-06 NOTE — PROGRESS NOTES
[NHANES]     Frequency of Communication with Friends and Family: Three times a week     Frequency of Social Gatherings with Friends and Family: Once a week     Attends Mu-ism Services: Never     Active Member of Clubs or Organizations: No     Attends Club or Organization Meetings: Never     Marital Status:    Intimate Partner Violence: Not on file   Housing Stability: Low Risk  (3/14/2022)    Housing Stability Vital Sign     Unable to Pay for Housing in the Last Year: No     Number of Places Lived in the Last Year: 1     Unstable Housing in the Last Year: No       OBJECTIVE:  /76   Pulse 71   Temp 96.8 °F (36 °C) (Temporal)   Resp 16   Ht 1.575 m (5' 2\")   Wt 70.4 kg (155 lb 3.2 oz)   SpO2 96%   BMI 28.39 kg/m²      Physical exam:  afebrile, vitals reviewed  Gen:  WD, WN, NAD, A&Ox3, pleasant  Eyes:  Sclerae clear  Neck:  Supple, No cervical or submandibular LAD. No obvious thyromegaly.  Heart:  RRR, no murmur, rubs, gallops  Lungs:  CTAB, no W/R/R  Abd:  soft, NT/ND  Skin: No obvious rashes      ASSESSMENT/PLAN:  1. Preoperative cardiovascular examination  Low risk patient for low risk procedure. EKG deferred. Cr and glc check today. Normal last September. Changes to meds below.  -May proceed to surgery from cardiovascular standpoint  Pt verbalized understanding and agrees with plan.  -     Basic Metabolic Panel; Future    2. Psoriasis  Est, stable. Hold humira on 6/26, restart on 6/28 after surgery.    3. Hyperlipidemia, unspecified hyperlipidemia type  Est, stable. Continue crestor as prescribed.      Return if symptoms worsen or fail to improve.    Electronically signed by Sunitha Gaston MD on 6/6/2024 at 9:44 AM.     Please note, portions of this note were completed with a voice recognition program.  Although every effort was made to ensure the accuracy of this automated transcription, some errors in transcription may have occurred.

## 2024-07-29 ENCOUNTER — OFFICE VISIT (OUTPATIENT)
Dept: FAMILY MEDICINE CLINIC | Age: 54
End: 2024-07-29
Payer: COMMERCIAL

## 2024-07-29 VITALS
WEIGHT: 158.3 LBS | SYSTOLIC BLOOD PRESSURE: 118 MMHG | HEIGHT: 62 IN | BODY MASS INDEX: 29.13 KG/M2 | DIASTOLIC BLOOD PRESSURE: 78 MMHG

## 2024-07-29 DIAGNOSIS — Z00.00 HEALTHCARE MAINTENANCE: Primary | ICD-10-CM

## 2024-07-29 DIAGNOSIS — Z87.891 PERSONAL HISTORY OF TOBACCO USE: ICD-10-CM

## 2024-07-29 DIAGNOSIS — Z00.00 HEALTHCARE MAINTENANCE: ICD-10-CM

## 2024-07-29 DIAGNOSIS — G25.81 RLS (RESTLESS LEGS SYNDROME): ICD-10-CM

## 2024-07-29 DIAGNOSIS — E66.3 OVERWEIGHT WITH BODY MASS INDEX (BMI) OF 28 TO 28.9 IN ADULT: ICD-10-CM

## 2024-07-29 DIAGNOSIS — E78.5 HYPERLIPIDEMIA, UNSPECIFIED HYPERLIPIDEMIA TYPE: ICD-10-CM

## 2024-07-29 LAB
CHOLEST SERPL-MCNC: 302 MG/DL (ref 0–199)
GLUCOSE SERPL-MCNC: 96 MG/DL (ref 70–99)
HBV SURFACE AB SERPL IA-ACNC: <3.5 MIU/ML
HBV SURFACE AG SERPL QL IA: NORMAL
HDLC SERPL-MCNC: 73 MG/DL (ref 40–60)
LDLC SERPL CALC-MCNC: 206 MG/DL
TRIGL SERPL-MCNC: 115 MG/DL (ref 0–150)

## 2024-07-29 PROCEDURE — 99214 OFFICE O/P EST MOD 30 MIN: CPT | Performed by: FAMILY MEDICINE

## 2024-07-29 PROCEDURE — 99396 PREV VISIT EST AGE 40-64: CPT | Performed by: FAMILY MEDICINE

## 2024-07-29 PROCEDURE — G0296 VISIT TO DETERM LDCT ELIG: HCPCS | Performed by: FAMILY MEDICINE

## 2024-07-29 RX ORDER — PHENTERMINE HYDROCHLORIDE 37.5 MG/1
37.5 TABLET ORAL
Qty: 30 TABLET | Refills: 0 | Status: SHIPPED | OUTPATIENT
Start: 2024-07-29 | End: 2024-08-28

## 2024-07-29 RX ORDER — GABAPENTIN 300 MG/1
CAPSULE ORAL
Qty: 90 CAPSULE | Refills: 0 | Status: SHIPPED | OUTPATIENT
Start: 2024-07-29 | End: 2024-08-29

## 2024-07-29 NOTE — PROGRESS NOTES
History and Physical      Chief Complaint:   Bette Weber is a 53 y.o. female who presents for complete physical examination.    Chief Complaint   Patient presents with    Follow-up           Assessment/Plan     Bette was seen today for follow-up.    Diagnoses and all orders for this visit:    Healthcare maintenance  -     Be Well Health Screen; Future  -     Hepatitis B Surface Antigen; Future  -     Hepatitis B Surface Antibody; Future  -     CHUCKY DIGITAL SCREEN W OR WO CAD BILATERAL; Future  -     Fecal DNA Colorectal cancer screening (Cologuard)    Hyperlipidemia, unspecified hyperlipidemia type    RLS (restless legs syndrome)  -     gabapentin (NEURONTIN) 300 MG capsule; TAKE ONE CAPSULE BY MOUTH EVERY NIGHT    Overweight with body mass index (BMI) of 28 to 28.9 in adult  -     phentermine (ADIPEX-P) 37.5 MG tablet; Take 1 tablet by mouth every morning (before breakfast) for 30 days. Max Daily Amount: 37.5 mg        Annual physical exam done today. Counseled on preventative care and a healthy lifestlye.            - RLS: stable. Continue gabapentin.   - HLD: stable. Continue crestor.   - Psoriasis: stable. Continue humira.  Seeing derm.   - Overweight with comorbidity/HLD:      Patient counseled on diet and exercise.  Discussed weight loss medication including Adipex as a possible option.  Discussed risks, benefits, and potential side effects of medication and patient demonstrates understanding.  Discussed the need to implement good exercise and diet habits prior to starting the medication and the need to continue lifestyle changes while on the medication and after stopping it.  Patient demonstrated understanding would like to start Adipex.     Bette will come in every 3 months for an in person visit while on Adipex.  In order to continue the Adipex beyond the initial 3 months she understands that she must maintain at least a 5% (stay at or below 150lbs) weight loss from her starting weight of 158

## 2024-07-30 DIAGNOSIS — E78.5 HYPERLIPIDEMIA, UNSPECIFIED HYPERLIPIDEMIA TYPE: ICD-10-CM

## 2024-07-30 RX ORDER — ROSUVASTATIN CALCIUM 40 MG/1
40 TABLET, COATED ORAL DAILY
Qty: 90 TABLET | Refills: 0 | Status: SHIPPED | OUTPATIENT
Start: 2024-07-30

## 2024-07-30 RX ORDER — ROSUVASTATIN CALCIUM 20 MG/1
20 TABLET, COATED ORAL DAILY
Qty: 90 TABLET | Refills: 2 | OUTPATIENT
Start: 2024-07-30

## 2024-07-30 NOTE — TELEPHONE ENCOUNTER
Medication:   Requested Prescriptions     Pending Prescriptions Disp Refills    rosuvastatin (CRESTOR) 20 MG tablet [Pharmacy Med Name: ROSUVASTATIN CALCIUM 20MG TABS] 90 tablet 2     Sig: TAKE ONE TABLET BY MOUTH ONCE A DAY       Last Filled:  07/30/2024     Duplicate request

## 2024-08-27 DIAGNOSIS — E66.3 OVERWEIGHT WITH BODY MASS INDEX (BMI) OF 28 TO 28.9 IN ADULT: ICD-10-CM

## 2024-08-27 RX ORDER — PHENTERMINE HYDROCHLORIDE 37.5 MG/1
37.5 TABLET ORAL
Qty: 30 TABLET | Refills: 0 | Status: SHIPPED | OUTPATIENT
Start: 2024-08-27 | End: 2024-09-26

## 2024-09-27 ENCOUNTER — PATIENT MESSAGE (OUTPATIENT)
Dept: FAMILY MEDICINE CLINIC | Age: 54
End: 2024-09-27

## 2024-09-27 DIAGNOSIS — E66.3 OVERWEIGHT WITH BODY MASS INDEX (BMI) OF 28 TO 28.9 IN ADULT: ICD-10-CM

## 2024-09-30 RX ORDER — PHENTERMINE HYDROCHLORIDE 37.5 MG/1
37.5 TABLET ORAL
Qty: 30 TABLET | Refills: 0 | Status: SHIPPED | OUTPATIENT
Start: 2024-09-30 | End: 2024-10-30

## 2024-09-30 NOTE — TELEPHONE ENCOUNTER
Medication:   Requested Prescriptions     Pending Prescriptions Disp Refills    phentermine (ADIPEX-P) 37.5 MG tablet 30 tablet 0     Sig: Take 1 tablet by mouth every morning (before breakfast) for 30 days. Max Daily Amount: 37.5 mg        Last Filled:  08/27/2024 #30 0rf     Patient Phone Number: [phone] (home) [phone] (work)    Last appt: 7/29/2024   Next appt: 10/28/2024    Last OARRS:       7/29/2024    10:00 AM   RX Monitoring   Periodic Controlled Substance Monitoring Possible medication side effects, risk of tolerance/dependence & alternative treatments discussed.;No signs of potential drug abuse or diversion identified.

## 2024-10-28 ENCOUNTER — OFFICE VISIT (OUTPATIENT)
Dept: FAMILY MEDICINE CLINIC | Age: 54
End: 2024-10-28

## 2024-10-28 VITALS
BODY MASS INDEX: 27.68 KG/M2 | DIASTOLIC BLOOD PRESSURE: 70 MMHG | SYSTOLIC BLOOD PRESSURE: 106 MMHG | WEIGHT: 150.4 LBS | HEART RATE: 70 BPM | HEIGHT: 62 IN | OXYGEN SATURATION: 96 %

## 2024-10-28 DIAGNOSIS — E66.3 OVERWEIGHT WITH BODY MASS INDEX (BMI) OF 28 TO 28.9 IN ADULT: Primary | ICD-10-CM

## 2024-10-28 DIAGNOSIS — E78.5 HYPERLIPIDEMIA, UNSPECIFIED HYPERLIPIDEMIA TYPE: ICD-10-CM

## 2024-10-28 DIAGNOSIS — G25.81 RLS (RESTLESS LEGS SYNDROME): ICD-10-CM

## 2024-10-28 RX ORDER — PHENTERMINE HYDROCHLORIDE 37.5 MG/1
37.5 TABLET ORAL
Qty: 30 TABLET | Refills: 0 | Status: SHIPPED | OUTPATIENT
Start: 2024-10-28 | End: 2024-11-27

## 2024-10-28 RX ORDER — ROSUVASTATIN CALCIUM 40 MG/1
40 TABLET, COATED ORAL DAILY
Qty: 90 TABLET | Refills: 3 | Status: SHIPPED | OUTPATIENT
Start: 2024-10-28

## 2024-10-28 RX ORDER — GABAPENTIN 300 MG/1
CAPSULE ORAL
Qty: 90 CAPSULE | Refills: 0 | Status: SHIPPED | OUTPATIENT
Start: 2024-10-28 | End: 2024-11-28

## 2024-10-28 NOTE — PROGRESS NOTES
Paying Living Expenses: Not hard at all   Food Insecurity: Not on file (12/5/2023)   Transportation Needs: No Transportation Needs (3/14/2022)    PRAPARE - Transportation     Lack of Transportation (Medical): No     Lack of Transportation (Non-Medical): No   Physical Activity: Sufficiently Active (3/14/2022)    Exercise Vital Sign     Days of Exercise per Week: 5 days     Minutes of Exercise per Session: 60 min   Stress: No Stress Concern Present (3/14/2022)    [place] [employer] - Occupational Stress Questionnaire     Feeling of Stress : Not at all   Social Connections: Socially Isolated (3/14/2022)    Social Connection and Isolation Panel [NHANES]     Frequency of Communication with Friends and Family: Three times a week     Frequency of Social Gatherings with Friends and Family: Once a week     Attends Christianity Services: Never     Active Member of Clubs or Organizations: No     Attends Club or Organization Meetings: Never     Marital Status:    Intimate Partner Violence: Not on file   Housing Stability: Low Risk  (3/14/2022)    Housing Stability Vital Sign     Unable to Pay for Housing in the Last Year: No     Number of Places Lived in the Last Year: 1     Unstable Housing in the Last Year: No         Review of Systems  As documented in the HPI. Currently no complaints of CP or MARIAM.     Physical Exam  Constitutional:       General: She is not in acute distress.     Appearance: She is well-developed.   HENT:      Head: Normocephalic and atraumatic.   Cardiovascular:      Rate and Rhythm: Normal rate and regular rhythm.      Heart sounds: Normal heart sounds. No murmur heard.  Pulmonary:      Effort: Pulmonary effort is normal. No respiratory distress.      Breath sounds: Normal breath sounds.   Skin:     General: Skin is warm and dry.   Neurological:      Mental Status: She is alert.   Psychiatric:         Behavior: Behavior normal.               Documentation was done using voice

## 2024-10-30 ENCOUNTER — OFFICE VISIT (OUTPATIENT)
Dept: DERMATOLOGY | Age: 54
End: 2024-10-30
Payer: COMMERCIAL

## 2024-10-30 DIAGNOSIS — L57.8 DIFFUSE PHOTODAMAGE OF SKIN: ICD-10-CM

## 2024-10-30 DIAGNOSIS — Z79.899 HIGH RISK MEDICATION USE: ICD-10-CM

## 2024-10-30 DIAGNOSIS — L40.9 PSORIASIS: Primary | ICD-10-CM

## 2024-10-30 PROCEDURE — 99214 OFFICE O/P EST MOD 30 MIN: CPT | Performed by: DERMATOLOGY

## 2024-10-30 RX ORDER — TRETINOIN 0.25 MG/G
CREAM TOPICAL
Qty: 20 G | Refills: 4 | Status: CANCELLED | OUTPATIENT
Start: 2024-10-30

## 2024-10-30 RX ORDER — TRETINOIN 0.5 MG/G
CREAM TOPICAL
Qty: 45 G | Refills: 4 | Status: SHIPPED | OUTPATIENT
Start: 2024-10-30

## 2024-10-30 NOTE — PROGRESS NOTES
[facility]  Lana Abad M.D.  [phone]       Bette Weber  1970    54 y.o. female     Date of Visit: 10/30/2024    Chief Complaint:   Chief Complaint   Patient presents with    Skin Lesion        I was asked to see this patient by Dr. Esparza ref. provider found.    History of Present Illness:  1.  Patient presents to for [id number] very well on Humira-continues to have residual psoriasis on her elbows which is not bothersome and she does not treat.  Sometimes uses a moisturizer.  Has had her flu shot, no illness, up-to-date on her vaccinations.    Has been using tretinoin 0.025% cream for quite some time-minimal erythema or scale.  Recently had Botox.  Happy with improvement.      Skin history:     Psoriasis-Humira since 2016.  Residual psoriasis elbows     No personal history of skin cancer. + History of actinic keratoses-liquid nitrogen     Mother, daughter with a history of melanoma     Former tanning bed user.  Now does practice sun avoidance and wear sunscreen    Review of Systems:  Constitutional: Reports general sense of well-being       Past Medical History, Surgical History, Family History, Medications and Allergies reviewed.    Social History:   Social History     Socioeconomic History    Marital status:      Spouse name: Not on file    Number of children: Not on file    Years of education: Not on file    Highest education level: Not on file   Occupational History    Occupation: Director     Employer: [employer]   Tobacco Use    Smoking status: Former     Current packs/day: 0.00     Average packs/day: 1 pack/day for 26.9 years (26.9 ttl pk-yrs)     Types: Cigarettes     Start date: 1992     Quit date: 2019     Years since quittin.8    Smokeless tobacco: Never   Vaping Use    Vaping status: Never Used   Substance and Sexual Activity    Alcohol use: Yes     Alcohol/week: 1.0 standard drink of alcohol     Types: 1 Glasses of wine per

## 2024-10-31 DIAGNOSIS — G25.81 RLS (RESTLESS LEGS SYNDROME): ICD-10-CM

## 2024-10-31 RX ORDER — GABAPENTIN 300 MG/1
CAPSULE ORAL
Qty: 90 CAPSULE | Refills: 0 | OUTPATIENT
Start: 2024-10-31 | End: 2024-11-30

## 2024-10-31 NOTE — TELEPHONE ENCOUNTER
Medication:   Requested Prescriptions     Pending Prescriptions Disp Refills    gabapentin (NEURONTIN) 300 MG capsule [Pharmacy Med Name: GABAPENTIN 300MG CAPS] 90 capsule 0     Sig: TAKE ONE CAPSULE BY MOUTH EVERY NIGHT        Last Filled:  10/28/2024     Duplicate request     Patient Phone Number: [phone] (home) [phone] (work)    Last appt: 10/28/2024   Next appt: Visit date not found    Last OARRS:       10/28/2024     9:11 AM   RX Monitoring   Periodic Controlled Substance Monitoring Possible medication side effects, risk of tolerance/dependence & alternative treatments discussed.;No signs of potential drug abuse or diversion identified.

## 2024-11-01 LAB

## 2024-11-25 DIAGNOSIS — L40.9 PSORIASIS: Primary | ICD-10-CM

## 2024-11-26 ENCOUNTER — TELEPHONE (OUTPATIENT)
Dept: DERMATOLOGY | Age: 54
End: 2024-11-26

## 2024-11-26 NOTE — TELEPHONE ENCOUNTER
There is a Humira approval scanned in media from yesterday. Please advise if this needs completed. Thanks!    If this requires a response please respond to the pool ( P MHCX PSC MEDICATION PRE-AUTH).      Thank you please advise patient.

## 2025-01-06 ENCOUNTER — PATIENT MESSAGE (OUTPATIENT)
Dept: FAMILY MEDICINE CLINIC | Age: 55
End: 2025-01-06

## 2025-01-06 DIAGNOSIS — E66.3 OVERWEIGHT WITH BODY MASS INDEX (BMI) OF 28 TO 28.9 IN ADULT: ICD-10-CM

## 2025-01-07 RX ORDER — PHENTERMINE HYDROCHLORIDE 37.5 MG/1
37.5 TABLET ORAL
Qty: 30 TABLET | Refills: 0 | Status: SHIPPED | OUTPATIENT
Start: 2025-01-07 | End: 2025-02-06

## 2025-01-07 NOTE — TELEPHONE ENCOUNTER
Medication:   Requested Prescriptions     Pending Prescriptions Disp Refills    phentermine (ADIPEX-P) 37.5 MG tablet 30 tablet 0     Sig: Take 1 tablet by mouth every morning (before breakfast) for 30 days. Max Daily Amount: 37.5 mg        Last Filled:  10/28/2024 #30 0rf     Patient Phone Number: [phone] (home) [phone] (work)    Last appt: 10/28/2024   Next appt: 1/27/2025    Last OARRS:       10/28/2024     9:11 AM   RX Monitoring   Periodic Controlled Substance Monitoring Possible medication side effects, risk of tolerance/dependence & alternative treatments discussed.;No signs of potential drug abuse or diversion identified.

## 2025-01-13 DIAGNOSIS — E66.3 OVERWEIGHT WITH BODY MASS INDEX (BMI) OF 28 TO 28.9 IN ADULT: ICD-10-CM

## 2025-01-13 RX ORDER — PHENTERMINE HYDROCHLORIDE 37.5 MG/1
37.5 TABLET ORAL
Qty: 30 TABLET | OUTPATIENT
Start: 2025-01-13

## 2025-01-27 ENCOUNTER — OFFICE VISIT (OUTPATIENT)
Dept: FAMILY MEDICINE CLINIC | Age: 55
End: 2025-01-27

## 2025-01-27 VITALS
OXYGEN SATURATION: 97 % | WEIGHT: 153.8 LBS | BODY MASS INDEX: 28.3 KG/M2 | TEMPERATURE: 97.2 F | HEIGHT: 62 IN | HEART RATE: 82 BPM

## 2025-01-27 DIAGNOSIS — E66.3 OVERWEIGHT WITH BODY MASS INDEX (BMI) OF 28 TO 28.9 IN ADULT: Primary | ICD-10-CM

## 2025-01-27 DIAGNOSIS — G25.81 RLS (RESTLESS LEGS SYNDROME): ICD-10-CM

## 2025-01-27 DIAGNOSIS — E78.5 HYPERLIPIDEMIA, UNSPECIFIED HYPERLIPIDEMIA TYPE: ICD-10-CM

## 2025-01-27 DIAGNOSIS — L40.9 PSORIASIS: ICD-10-CM

## 2025-01-27 RX ORDER — PHENTERMINE HYDROCHLORIDE 37.5 MG/1
37.5 TABLET ORAL
Qty: 90 TABLET | Refills: 0 | Status: CANCELLED | OUTPATIENT
Start: 2025-01-27 | End: 2025-04-27

## 2025-01-27 RX ORDER — PHENTERMINE HYDROCHLORIDE 37.5 MG/1
37.5 TABLET ORAL
Qty: 90 TABLET | Refills: 0 | Status: SHIPPED | OUTPATIENT
Start: 2025-01-27 | End: 2025-04-27

## 2025-01-27 RX ORDER — GABAPENTIN 300 MG/1
CAPSULE ORAL
Qty: 90 CAPSULE | Refills: 0 | Status: SHIPPED | OUTPATIENT
Start: 2025-01-27 | End: 2025-02-27

## 2025-01-27 SDOH — ECONOMIC STABILITY: FOOD INSECURITY: WITHIN THE PAST 12 MONTHS, YOU WORRIED THAT YOUR FOOD WOULD RUN OUT BEFORE YOU GOT MONEY TO BUY MORE.: NEVER TRUE

## 2025-01-27 SDOH — ECONOMIC STABILITY: FOOD INSECURITY: WITHIN THE PAST 12 MONTHS, THE FOOD YOU BOUGHT JUST DIDN'T LAST AND YOU DIDN'T HAVE MONEY TO GET MORE.: NEVER TRUE

## 2025-01-27 ASSESSMENT — PATIENT HEALTH QUESTIONNAIRE - PHQ9
10. IF YOU CHECKED OFF ANY PROBLEMS, HOW DIFFICULT HAVE THESE PROBLEMS MADE IT FOR YOU TO DO YOUR WORK, TAKE CARE OF THINGS AT HOME, OR GET ALONG WITH OTHER PEOPLE: NOT DIFFICULT AT ALL
9. THOUGHTS THAT YOU WOULD BE BETTER OFF DEAD, OR OF HURTING YOURSELF: NOT AT ALL
6. FEELING BAD ABOUT YOURSELF - OR THAT YOU ARE A FAILURE OR HAVE LET YOURSELF OR YOUR FAMILY DOWN: NOT AT ALL
4. FEELING TIRED OR HAVING LITTLE ENERGY: NOT AT ALL
SUM OF ALL RESPONSES TO PHQ QUESTIONS 1-9: 0
1. LITTLE INTEREST OR PLEASURE IN DOING THINGS: NOT AT ALL
8. MOVING OR SPEAKING SO SLOWLY THAT OTHER PEOPLE COULD HAVE NOTICED. OR THE OPPOSITE, BEING SO FIGETY OR RESTLESS THAT YOU HAVE BEEN MOVING AROUND A LOT MORE THAN USUAL: NOT AT ALL
SUM OF ALL RESPONSES TO PHQ QUESTIONS 1-9: 0
SUM OF ALL RESPONSES TO PHQ QUESTIONS 1-9: 0
SUM OF ALL RESPONSES TO PHQ9 QUESTIONS 1 & 2: 0
5. POOR APPETITE OR OVEREATING: NOT AT ALL
SUM OF ALL RESPONSES TO PHQ QUESTIONS 1-9: 0
7. TROUBLE CONCENTRATING ON THINGS, SUCH AS READING THE NEWSPAPER OR WATCHING TELEVISION: NOT AT ALL
3. TROUBLE FALLING OR STAYING ASLEEP: NOT AT ALL
2. FEELING DOWN, DEPRESSED OR HOPELESS: NOT AT ALL

## 2025-01-27 NOTE — PROGRESS NOTES
Bette Weber   YOB: 1970    Date of Visit:  1/27/2025    Allergies   Allergen Reactions    Penicillins Anaphylaxis     Outpatient Medications Marked as Taking for the 1/27/25 encounter (Office Visit) with Leila Griffith MD   Medication Sig Dispense Refill    gabapentin (NEURONTIN) 300 MG capsule TAKE ONE CAPSULE BY MOUTH EVERY NIGHT 90 capsule 0    phentermine (ADIPEX-P) 37.5 MG tablet Take 1 tablet by mouth every morning (before breakfast) for 90 days. Max Daily Amount: 37.5 mg 90 tablet 0    adalimumab (HUMIRA) 40 MG/0.4ML [employer] injection pen kit Inject 0.4 mL as directed every other week 2 each 11    tretinoin (RETIN-A) 0.05 % cream Apply a pea sized amount to the face QHS 45 g 4    rosuvastatin (CRESTOR) 40 MG tablet Take 1 tablet by mouth daily 90 tablet 3    clobetasol (TEMOVATE) 0.05 % cream Apply BID for up to 2 weeks 60 g 2    Multiple Vitamins-Minerals (THERAPEUTIC MULTIVITAMIN-MINERALS) tablet Take 1 tablet by mouth daily           Vitals:    01/27/25 1002   Pulse: 82   Temp: 97.2 °F (36.2 °C)   TempSrc: Temporal   SpO2: 97%   Weight: 69.8 kg (153 lb 12.8 oz)   Height: 1.575 m (5' 2\")     Body mass index is 28.13 kg/m².     Wt Readings from Last 3 Encounters:   01/27/25 69.8 kg (153 lb 12.8 oz)   10/28/24 68.2 kg (150 lb 6.4 oz)   07/29/24 71.8 kg (158 lb 4.8 oz)     BP Readings from Last 3 Encounters:   10/28/24 106/70   07/29/24 118/78   06/06/24 100/76        Chief Complaint   Patient presents with    Weight Management    Medication Check         Assessment/Plan:    Bette \"Anastasia\" was seen today for weight management and medication check.    Diagnoses and all orders for this visit:    Overweight with body mass index (BMI) of 28 to 28.9 in adult  -     phentermine (ADIPEX-P) 37.5 MG tablet; Take 1 tablet by mouth every morning (before breakfast) for 90 days. Max Daily Amount: 37.5 mg    RLS (restless legs syndrome)  -     gabapentin (NEURONTIN) 300 MG capsule; TAKE ONE

## 2025-04-04 ENCOUNTER — TELEPHONE (OUTPATIENT)
Dept: FAMILY MEDICINE CLINIC | Age: 55
End: 2025-04-04

## 2025-05-27 DIAGNOSIS — G25.81 RLS (RESTLESS LEGS SYNDROME): ICD-10-CM

## 2025-05-27 RX ORDER — GABAPENTIN 300 MG/1
CAPSULE ORAL
Qty: 90 CAPSULE | Refills: 0 | Status: SHIPPED | OUTPATIENT
Start: 2025-05-27 | End: 2025-08-27

## 2025-05-27 NOTE — TELEPHONE ENCOUNTER
Patient scheduled.    Recent Visits  Date Type Provider Dept   01/27/25 Office Visit Leila Griffith MD [facility]   10/28/24 Office Visit Leila Griffith MD [facility]   07/29/24 Office Visit Leila Griffith MD [facility]   06/06/24 Office Visit Sunitha Gaston MD [facility]   Showing recent visits within past 540 days with a meds authorizing provider and meeting all other requirements  Future Appointments  Date Type Provider Dept   07/11/25 Appointment Leila Griffith MD [facility]   Showing future appointments within next 150 days with a meds authorizing provider and meeting all other requirements

## 2025-05-28 ENCOUNTER — OFFICE VISIT (OUTPATIENT)
Age: 55
End: 2025-05-28
Payer: COMMERCIAL

## 2025-05-28 ENCOUNTER — TELEPHONE (OUTPATIENT)
Age: 55
End: 2025-05-28

## 2025-05-28 DIAGNOSIS — D22.9 BENIGN NEVUS: ICD-10-CM

## 2025-05-28 DIAGNOSIS — Z79.899 HIGH RISK MEDICATION USE: Primary | ICD-10-CM

## 2025-05-28 DIAGNOSIS — L40.9 PSORIASIS: ICD-10-CM

## 2025-05-28 DIAGNOSIS — Z79.899 HIGH RISK MEDICATION USE: ICD-10-CM

## 2025-05-28 PROCEDURE — 99214 OFFICE O/P EST MOD 30 MIN: CPT | Performed by: DERMATOLOGY

## 2025-05-28 NOTE — TELEPHONE ENCOUNTER
from [facility] is calling- -patient is at the lab they can not release the Quantiferon TB gold because it is 30 days out can you place the order for today since the patient is there now. [facility]

## 2025-05-28 NOTE — PROGRESS NOTES
[facility]  Lana Abad M.D.  [phone]       Bette Weber  1970    54 y.o. female     Date of Visit: 2025    Chief Complaint:   Chief Complaint   Patient presents with    Lesion(s)        I was asked to see this patient by Dr. Esparza ref. provider found.    History of Present Illness:  1. TBSE    Multiple nevi. Patient has not noticed any new or changing pigmented lesions.   Stable in size, shape, color. Not itching, bleeding.     Has been on Humira since .  Continues to have some psoriasis on her elbows.  No symptoms of arthritis.  Tolerates Humira without difficulty.  Recent labs ordered by her primary care provider but patient has not had these done.  Has not had a QuantiFERON gold since .  No symptoms.    Skin history:     Psoriasis-Humira since 2016.  Residual psoriasis elbows     No personal history of skin cancer. + History of actinic keratoses-liquid nitrogen     Mother, daughter with a history of melanoma     Former tanning bed user.  Now does practice sun avoidance and wear sunscreen    Review of Systems:  Constitutional: Reports general sense of well-being       Past Medical History, Surgical History, Family History, Medications and Allergies reviewed.    Social History:   Social History     Socioeconomic History    Marital status:      Spouse name: Not on file    Number of children: Not on file    Years of education: Not on file    Highest education level: Not on file   Occupational History    Occupation: Director     Employer: [employer]   Tobacco Use    Smoking status: Former     Current packs/day: 0.00     Average packs/day: 1 pack/day for 26.9 years (26.9 ttl pk-yrs)     Types: Cigarettes     Start date: 1992     Quit date: 2019     Years since quittin.4    Smokeless tobacco: Never   Vaping Use    Vaping status: Never Used   Substance and Sexual Activity    Alcohol use: Yes     Alcohol/week: 1.0 standard drink of

## 2025-05-31 LAB
GAMMA INTERFERON BACKGROUND BLD IA-ACNC: 0.05 IU/ML
M TB IFN-G BLD-IMP: NEGATIVE
M TB IFN-G CD4+ BCKGRND COR BLD-ACNC: 0 IU/ML
M TB IFN-G CD4+CD8+ BCKGRND COR BLD-ACNC: 0 IU/ML
MITOGEN IGNF BCKGRD COR BLD-ACNC: 9.95 IU/ML

## 2025-06-06 ENCOUNTER — PATIENT MESSAGE (OUTPATIENT)
Dept: FAMILY MEDICINE CLINIC | Age: 55
End: 2025-06-06

## 2025-06-06 DIAGNOSIS — G25.81 RLS (RESTLESS LEGS SYNDROME): ICD-10-CM

## 2025-06-06 RX ORDER — GABAPENTIN 300 MG/1
CAPSULE ORAL
Qty: 14 CAPSULE | Refills: 0 | Status: SHIPPED | OUTPATIENT
Start: 2025-06-06 | End: 2025-09-06

## 2025-06-25 ENCOUNTER — HOSPITAL ENCOUNTER (OUTPATIENT)
Age: 55
Discharge: HOME | End: 2025-06-25
Payer: COMMERCIAL

## 2025-06-25 DIAGNOSIS — Z87.891 PERSONAL HISTORY OF TOBACCO USE: ICD-10-CM

## 2025-06-25 PROCEDURE — 71271 CT THORAX LUNG CANCER SCR C-: CPT

## 2025-06-26 ENCOUNTER — RESULTS FOLLOW-UP (OUTPATIENT)
Dept: FAMILY MEDICINE CLINIC | Age: 55
End: 2025-06-26

## 2025-07-10 ENCOUNTER — TELEPHONE (OUTPATIENT)
Dept: FAMILY MEDICINE CLINIC | Age: 55
End: 2025-07-10

## 2025-07-10 NOTE — TELEPHONE ENCOUNTER
Sent patient my chart message letting her know that the office tried to call and schedule her to either call back or schedule my chart

## 2025-07-10 NOTE — TELEPHONE ENCOUNTER
----- Message from Yoon V sent at 7/10/2025  8:40 AM EDT -----  Regarding: [facility] Appointment Request  [facility] Appointment Request    Patient needs appointment for [facility] Appointment Type: Annual Visit.    Patient Requested Dates(s):Any day before September 2025  Patient Requested Time: Morning  Provider Name: Leila Meng MD    Reason for Appointment Request: Established Patient - Available appointments did not meet patient need  The patient needed to reschedule due to schedule conflict.  --------------------------------------------------------------------------------------------------------------------------    Relationship to Patient: Self     Call Back Information: OK to leave message on voicemail  Preferred Call Back Number: [phone]

## 2025-07-24 ENCOUNTER — PATIENT MESSAGE (OUTPATIENT)
Dept: FAMILY MEDICINE CLINIC | Age: 55
End: 2025-07-24

## 2025-07-24 NOTE — TELEPHONE ENCOUNTER
Patient scheduled.    Recent Visits  Date Type Provider Dept   01/27/25 Office Visit Leila Griffith MD [facility]   10/28/24 [facility]   07/29/24 Office Visit Leila Griffith MD [facility]   06/06/24 Office Visit Sunitha Gaston MD [facility]   Showing recent visits within past 540 days with a meds authorizing provider and meeting all other requirements  Future Appointments  Date Type Provider Dept   08/11/25 Appointment Leila Griffith MD [facility]   Showing future appointments within next 150 days with a meds authorizing provider and meeting all other requirements

## 2025-08-11 ENCOUNTER — OFFICE VISIT (OUTPATIENT)
Dept: FAMILY MEDICINE CLINIC | Age: 55
End: 2025-08-11
Payer: COMMERCIAL

## 2025-08-11 VITALS
HEART RATE: 76 BPM | HEIGHT: 62 IN | SYSTOLIC BLOOD PRESSURE: 110 MMHG | WEIGHT: 152 LBS | DIASTOLIC BLOOD PRESSURE: 70 MMHG | BODY MASS INDEX: 27.97 KG/M2 | OXYGEN SATURATION: 97 %

## 2025-08-11 DIAGNOSIS — Z00.00 HEALTHCARE MAINTENANCE: Primary | ICD-10-CM

## 2025-08-11 DIAGNOSIS — G25.81 RLS (RESTLESS LEGS SYNDROME): ICD-10-CM

## 2025-08-11 DIAGNOSIS — Z12.11 SCREEN FOR COLON CANCER: ICD-10-CM

## 2025-08-11 DIAGNOSIS — L40.9 PSORIASIS: ICD-10-CM

## 2025-08-11 DIAGNOSIS — E78.5 HYPERLIPIDEMIA, UNSPECIFIED HYPERLIPIDEMIA TYPE: ICD-10-CM

## 2025-08-11 PROCEDURE — 90746 HEPB VACCINE 3 DOSE ADULT IM: CPT | Performed by: FAMILY MEDICINE

## 2025-08-11 PROCEDURE — 90471 IMMUNIZATION ADMIN: CPT | Performed by: FAMILY MEDICINE

## 2025-08-11 PROCEDURE — 90677 PCV20 VACCINE IM: CPT | Performed by: FAMILY MEDICINE

## 2025-08-11 PROCEDURE — 99396 PREV VISIT EST AGE 40-64: CPT | Performed by: FAMILY MEDICINE

## 2025-08-11 PROCEDURE — 90472 IMMUNIZATION ADMIN EACH ADD: CPT | Performed by: FAMILY MEDICINE

## 2025-08-11 PROCEDURE — 99214 OFFICE O/P EST MOD 30 MIN: CPT | Performed by: FAMILY MEDICINE

## 2025-08-11 RX ORDER — ROSUVASTATIN CALCIUM 40 MG/1
40 TABLET, COATED ORAL DAILY
Qty: 90 TABLET | Refills: 3 | Status: SHIPPED | OUTPATIENT
Start: 2025-08-11

## 2025-08-11 RX ORDER — GABAPENTIN 300 MG/1
CAPSULE ORAL
Qty: 90 CAPSULE | Refills: 1 | Status: SHIPPED | OUTPATIENT
Start: 2025-08-11 | End: 2025-11-11

## 2025-08-12 LAB
ALBUMIN SERPL-MCNC: 4.6 G/DL (ref 3.4–5)
ALBUMIN/GLOB SERPL: 1.7 {RATIO} (ref 1.1–2.2)
ALP SERPL-CCNC: 75 U/L (ref 40–129)
ALT SERPL-CCNC: 53 U/L (ref 10–40)
ANION GAP SERPL CALCULATED.3IONS-SCNC: 11 MMOL/L (ref 3–16)
AST SERPL-CCNC: 40 U/L (ref 15–37)
BASOPHILS # BLD: 0 K/UL (ref 0–0.2)
BASOPHILS NFR BLD: 0.2 %
BILIRUB SERPL-MCNC: 0.3 MG/DL (ref 0–1)
BUN SERPL-MCNC: 9 MG/DL (ref 7–20)
CALCIUM SERPL-MCNC: 9.9 MG/DL (ref 8.3–10.6)
CHLORIDE SERPL-SCNC: 105 MMOL/L (ref 99–110)
CHOLEST SERPL-MCNC: 131 MG/DL (ref 0–199)
CO2 SERPL-SCNC: 24 MMOL/L (ref 21–32)
CREAT SERPL-MCNC: 0.9 MG/DL (ref 0.6–1.1)
DEPRECATED RDW RBC AUTO: 13.6 % (ref 12.4–15.4)
EOSINOPHIL # BLD: 0 K/UL (ref 0–0.6)
EOSINOPHIL NFR BLD: 0.6 %
GFR SERPLBLD CREATININE-BSD FMLA CKD-EPI: 75 ML/MIN/{1.73_M2}
GLUCOSE SERPL-MCNC: 79 MG/DL (ref 70–99)
HCT VFR BLD AUTO: 43.1 % (ref 36–48)
HDLC SERPL-MCNC: 62 MG/DL (ref 40–60)
HGB BLD-MCNC: 14.8 G/DL (ref 12–16)
LDLC SERPL CALC-MCNC: 52 MG/DL
LYMPHOCYTES # BLD: 2 K/UL (ref 1–5.1)
LYMPHOCYTES NFR BLD: 45 %
MCH RBC QN AUTO: 29.7 PG (ref 26–34)
MCHC RBC AUTO-ENTMCNC: 34.3 G/DL (ref 31–36)
MCV RBC AUTO: 86.4 FL (ref 80–100)
MONOCYTES # BLD: 0.7 K/UL (ref 0–1.3)
MONOCYTES NFR BLD: 15 %
NEUTROPHILS # BLD: 1.7 K/UL (ref 1.7–7.7)
NEUTROPHILS NFR BLD: 39.2 %
PLATELET # BLD AUTO: 158 K/UL (ref 135–450)
PMV BLD AUTO: 11 FL (ref 5–10.5)
POTASSIUM SERPL-SCNC: 4.4 MMOL/L (ref 3.5–5.1)
PROT SERPL-MCNC: 7.3 G/DL (ref 6.4–8.2)
RBC # BLD AUTO: 4.99 M/UL (ref 4–5.2)
SODIUM SERPL-SCNC: 140 MMOL/L (ref 136–145)
TRIGL SERPL-MCNC: 85 MG/DL (ref 0–150)
VLDLC SERPL CALC-MCNC: 17 MG/DL
WBC # BLD AUTO: 4.4 K/UL (ref 4–11)

## (undated) DEVICE — PLATE ES AD W 9FT CRD 2

## (undated) DEVICE — ADHESIVE SKIN CLSR 0.7ML TOP DERMBND ADV

## (undated) DEVICE — E-Z CLEAN, NON-STICK, PTFE COATED, ELECTROSURGICAL BLADE ELECTRODE, MODIFIED EXTENDED INSULATION, 2.5 INCH (6.35 CM): Brand: MEGADYNE

## (undated) DEVICE — SUTURE MCRYL SZ 4-0 L27IN ABSRB UD L19MM PS-2 1/2 CIR PRIM Y426H

## (undated) DEVICE — STERILE POLYISOPRENE POWDER-FREE SURGICAL GLOVES WITH EMOLLIENT COATING: Brand: PROTEXIS

## (undated) DEVICE — MEDICINE CUP, GRADUATED, STER: Brand: MEDLINE

## (undated) DEVICE — SUTURE VCRL SZ 3-0 L27IN ABSRB UD L26MM SH 1/2 CIR J416H

## (undated) DEVICE — JEWISH HOSPITAL TURNOVER KIT: Brand: MEDLINE INDUSTRIES, INC.

## (undated) DEVICE — INTENDED FOR TISSUE SEPARATION, AND OTHER PROCEDURES THAT REQUIRE A SHARP SURGICAL BLADE TO PUNCTURE OR CUT.: Brand: BARD-PARKER ® CARBON RIB-BACK BLADES

## (undated) DEVICE — BOWL MED L 32OZ PLAS W/ MOLD GRAD EZ OPN PEEL PCH

## (undated) DEVICE — PENCIL ES ULT VAC W TELSCP NOSE EZ CLN BLDE 10FT

## (undated) DEVICE — ST FLUFF LG 1 PLY: Brand: DEROYAL

## (undated) DEVICE — GARMENT,MEDLINE,DVT,INT,CALF,MED, GEN2: Brand: MEDLINE

## (undated) DEVICE — GOWN,SIRUS,POLYRNF,BRTHSLV,XLN/XL,20/CS: Brand: MEDLINE

## (undated) DEVICE — SYRINGE MED 10ML LUERLOCK TIP W/O SFTY DISP

## (undated) DEVICE — DRAPE,T,LAPARO,TRANS,STERILE: Brand: MEDLINE

## (undated) DEVICE — APPLICATOR MEDICATED 26 CC SOLUTION HI LT ORNG CHLORAPREP

## (undated) DEVICE — SURGICAL SET UP - SURE SET: Brand: MEDLINE INDUSTRIES, INC.

## (undated) DEVICE — GLOVE ORANGE PI 7   MSG9070

## (undated) DEVICE — STANDARD HYPODERMIC NEEDLE,POLYPROPYLENE HUB: Brand: MONOJECT

## (undated) DEVICE — CONTAINER,SPECIMEN,PNEU TUBE,3OZ,OR STRL: Brand: MEDLINE

## (undated) DEVICE — COVER LT HNDL BLU PLAS

## (undated) DEVICE — YANKAUER,OPEN TIP,W/O VENT,STERILE: Brand: MEDLINE INDUSTRIES, INC.